# Patient Record
Sex: FEMALE | Race: BLACK OR AFRICAN AMERICAN | Employment: OTHER | URBAN - METROPOLITAN AREA
[De-identification: names, ages, dates, MRNs, and addresses within clinical notes are randomized per-mention and may not be internally consistent; named-entity substitution may affect disease eponyms.]

---

## 2017-01-23 PROBLEM — G89.29 CHRONIC PAIN OF BOTH KNEES: Status: ACTIVE | Noted: 2017-01-23

## 2017-01-23 PROBLEM — M25.562 CHRONIC PAIN OF BOTH KNEES: Status: ACTIVE | Noted: 2017-01-23

## 2017-01-23 PROBLEM — Z76.89 ESTABLISHING CARE WITH NEW DOCTOR, ENCOUNTER FOR: Status: ACTIVE | Noted: 2017-01-23

## 2017-01-23 PROBLEM — Z79.899 ENCOUNTER FOR LONG-TERM (CURRENT) USE OF MEDICATIONS: Status: ACTIVE | Noted: 2017-01-23

## 2017-01-23 PROBLEM — I10 ESSENTIAL HYPERTENSION: Status: ACTIVE | Noted: 2017-01-23

## 2017-01-23 PROBLEM — K21.9 GASTROESOPHAGEAL REFLUX DISEASE WITHOUT ESOPHAGITIS: Status: ACTIVE | Noted: 2017-01-23

## 2017-01-23 PROBLEM — E78.2 MIXED HYPERLIPIDEMIA: Status: ACTIVE | Noted: 2017-01-23

## 2017-01-23 PROBLEM — M25.561 CHRONIC PAIN OF BOTH KNEES: Status: ACTIVE | Noted: 2017-01-23

## 2017-05-01 PROBLEM — I10 ESSENTIAL HYPERTENSION: Chronic | Status: ACTIVE | Noted: 2017-01-23

## 2017-05-01 PROBLEM — Z79.899 ENCOUNTER FOR LONG-TERM (CURRENT) USE OF MEDICATIONS: Chronic | Status: ACTIVE | Noted: 2017-01-23

## 2017-05-01 PROBLEM — E55.9 VITAMIN D DEFICIENCY: Chronic | Status: ACTIVE | Noted: 2017-05-01

## 2017-05-01 PROBLEM — E78.2 MIXED HYPERLIPIDEMIA: Chronic | Status: ACTIVE | Noted: 2017-01-23

## 2017-05-01 PROBLEM — E55.9 VITAMIN D DEFICIENCY: Status: ACTIVE | Noted: 2017-05-01

## 2017-08-22 PROBLEM — M72.2 PLANTAR FASCIITIS OF LEFT FOOT: Chronic | Status: ACTIVE | Noted: 2017-08-22

## 2017-08-22 PROBLEM — M72.2 PLANTAR FASCIITIS OF LEFT FOOT: Status: ACTIVE | Noted: 2017-08-22

## 2017-10-04 PROCEDURE — 99285 EMERGENCY DEPT VISIT HI MDM: CPT | Performed by: EMERGENCY MEDICINE

## 2017-10-05 ENCOUNTER — HOSPITAL ENCOUNTER (EMERGENCY)
Age: 57
Discharge: HOME | End: 2017-10-05
Attending: EMERGENCY MEDICINE
Payer: MEDICARE

## 2017-10-05 VITALS
WEIGHT: 201 LBS | HEART RATE: 86 BPM | DIASTOLIC BLOOD PRESSURE: 86 MMHG | OXYGEN SATURATION: 97 % | TEMPERATURE: 98 F | HEIGHT: 65 IN | RESPIRATION RATE: 16 BRPM | BODY MASS INDEX: 33.49 KG/M2 | SYSTOLIC BLOOD PRESSURE: 144 MMHG

## 2017-10-05 DIAGNOSIS — R00.2 PALPITATIONS: Primary | ICD-10-CM

## 2017-10-05 LAB
ALBUMIN SERPL-MCNC: 3.4 G/DL (ref 3.5–5)
ALBUMIN/GLOB SERPL: 0.7 {RATIO} (ref 1.2–3.5)
ALP SERPL-CCNC: 96 U/L (ref 50–136)
ALT SERPL-CCNC: 14 U/L (ref 12–65)
ANION GAP SERPL CALC-SCNC: 10 MMOL/L (ref 7–16)
AST SERPL-CCNC: 12 U/L (ref 15–37)
ATRIAL RATE: 178 BPM
BASOPHILS # BLD: 0 K/UL (ref 0–0.2)
BASOPHILS NFR BLD: 0 % (ref 0–2)
BILIRUB SERPL-MCNC: 0.4 MG/DL (ref 0.2–1.1)
BUN SERPL-MCNC: 14 MG/DL (ref 6–23)
CALCIUM SERPL-MCNC: 8.6 MG/DL (ref 8.3–10.4)
CALCULATED R AXIS, ECG10: 21 DEGREES
CALCULATED T AXIS, ECG11: 90 DEGREES
CHLORIDE SERPL-SCNC: 100 MMOL/L (ref 98–107)
CO2 SERPL-SCNC: 28 MMOL/L (ref 21–32)
CREAT SERPL-MCNC: 0.71 MG/DL (ref 0.6–1)
DIAGNOSIS, 93000: NORMAL
DIFFERENTIAL METHOD BLD: ABNORMAL
EOSINOPHIL # BLD: 0.2 K/UL (ref 0–0.8)
EOSINOPHIL NFR BLD: 2 % (ref 0.5–7.8)
ERYTHROCYTE [DISTWIDTH] IN BLOOD BY AUTOMATED COUNT: 13.8 % (ref 11.9–14.6)
GLOBULIN SER CALC-MCNC: 5 G/DL (ref 2.3–3.5)
GLUCOSE SERPL-MCNC: 124 MG/DL (ref 65–100)
HCT VFR BLD AUTO: 35.9 % (ref 35.8–46.3)
HGB BLD-MCNC: 11.8 G/DL (ref 11.7–15.4)
IMM GRANULOCYTES # BLD: 0 K/UL (ref 0–0.5)
IMM GRANULOCYTES NFR BLD: 0.3 % (ref 0–5)
LYMPHOCYTES # BLD: 3.1 K/UL (ref 0.5–4.6)
LYMPHOCYTES NFR BLD: 30 % (ref 13–44)
MAGNESIUM SERPL-MCNC: 1.6 MG/DL (ref 1.8–2.4)
MCH RBC QN AUTO: 24.4 PG (ref 26.1–32.9)
MCHC RBC AUTO-ENTMCNC: 32.9 G/DL (ref 31.4–35)
MCV RBC AUTO: 74.3 FL (ref 79.6–97.8)
MONOCYTES # BLD: 0.9 K/UL (ref 0.1–1.3)
MONOCYTES NFR BLD: 9 % (ref 4–12)
NEUTS SEG # BLD: 6.2 K/UL (ref 1.7–8.2)
NEUTS SEG NFR BLD: 59 % (ref 43–78)
PLATELET # BLD AUTO: 396 K/UL (ref 150–450)
PMV BLD AUTO: 11.1 FL (ref 10.8–14.1)
POTASSIUM SERPL-SCNC: 3.3 MMOL/L (ref 3.5–5.1)
PROT SERPL-MCNC: 8.4 G/DL (ref 6.3–8.2)
Q-T INTERVAL, ECG07: 276 MS
QRS DURATION, ECG06: 82 MS
QTC CALCULATION (BEZET), ECG08: 473 MS
RBC # BLD AUTO: 4.83 M/UL (ref 4.05–5.25)
SODIUM SERPL-SCNC: 138 MMOL/L (ref 136–145)
TROPONIN I SERPL-MCNC: <0.02 NG/ML (ref 0.02–0.05)
VENTRICULAR RATE, ECG03: 177 BPM
WBC # BLD AUTO: 10.6 K/UL (ref 4.3–11.1)

## 2017-10-05 PROCEDURE — 93005 ELECTROCARDIOGRAM TRACING: CPT | Performed by: EMERGENCY MEDICINE

## 2017-10-05 PROCEDURE — 74011250637 HC RX REV CODE- 250/637: Performed by: EMERGENCY MEDICINE

## 2017-10-05 PROCEDURE — 84484 ASSAY OF TROPONIN QUANT: CPT | Performed by: EMERGENCY MEDICINE

## 2017-10-05 PROCEDURE — 85025 COMPLETE CBC W/AUTO DIFF WBC: CPT | Performed by: EMERGENCY MEDICINE

## 2017-10-05 PROCEDURE — 83735 ASSAY OF MAGNESIUM: CPT | Performed by: EMERGENCY MEDICINE

## 2017-10-05 PROCEDURE — 80053 COMPREHEN METABOLIC PANEL: CPT | Performed by: EMERGENCY MEDICINE

## 2017-10-05 RX ORDER — LANOLIN ALCOHOL/MO/W.PET/CERES
400 CREAM (GRAM) TOPICAL ONCE
Status: COMPLETED | OUTPATIENT
Start: 2017-10-05 | End: 2017-10-05

## 2017-10-05 RX ORDER — LANOLIN ALCOHOL/MO/W.PET/CERES
CREAM (GRAM) TOPICAL
Status: DISCONTINUED
Start: 2017-10-05 | End: 2017-10-05 | Stop reason: HOSPADM

## 2017-10-05 RX ORDER — METOPROLOL TARTRATE 25 MG/1
12.5 TABLET, FILM COATED ORAL ONCE
Status: COMPLETED | OUTPATIENT
Start: 2017-10-05 | End: 2017-10-05

## 2017-10-05 RX ORDER — ADENOSINE 3 MG/ML
INJECTION, SOLUTION INTRAVENOUS
Status: DISCONTINUED
Start: 2017-10-05 | End: 2017-10-05 | Stop reason: ALTCHOICE

## 2017-10-05 RX ADMIN — METOPROLOL TARTRATE 12.5 MG: 25 TABLET ORAL at 01:15

## 2017-10-05 RX ADMIN — Medication 400 MG: at 01:16

## 2017-10-05 NOTE — ED NOTES
I have reviewed discharge instructions with the patient. The patient verbalized understanding. Patient left ED via Discharge Method: ambulatory to Home with family. Opportunity for questions and clarification provided. Patient given 0 scripts.

## 2017-10-05 NOTE — DISCHARGE INSTRUCTIONS
Return with any fevers, vomiting, difficulty breathing, worsening symptoms, or additional concerns. Follow-up with your cardiologist and primary care doctor as planned.

## 2017-10-05 NOTE — ED PROVIDER NOTES
HPI Comments: 68-year-old lady with a history of palpitations that she says started couple hours prior to arrival.  Patient said that it felt like her heart was racing and when she tried to get up she felt very dizzy. She said she did not have any chest pain with this and has had no difficulty breathing or shortness of breath. Patient says she normally takes 12.5 mg of metoprolol twice a day but since she took her morning dose later than normal she did not take her nighttime dose. Patient says she's had no fevers or chills and no other symptoms. Elements of this note were created using speech recognition software. As such, errors of speech recognition may be present. Patient is a 59 y.o. female presenting with palpitations. The history is provided by the patient. Palpitations    Associated symptoms include dizziness. Pertinent negatives include no diaphoresis, no fever, no chest pain, no abdominal pain, no nausea, no vomiting, no headaches, no weakness, no cough and no shortness of breath.         Past Medical History:   Diagnosis Date    Allergic rhinitis     Anemia     Arthralgia     Arthritis     Bunion     Chronic pain of both knees 1/23/2017    Cough     Depression     Diabetes (HCC)     Disorder of bone and cartilage     Dysphagia     Esophageal reflux     Essential hypertension 1/23/2017    Gastroesophageal reflux disease without esophagitis 1/23/2017    Hx of headache     Hx of lymphadenopathy     Hyperlipemia     Hyperlipidemia     Hypertension     Irritable bowel syndrome     Medial epicondylitis, right elbow     Migraine     Mixed hyperlipidemia 1/23/2017    Nasal congestion     Palpitations     Sinusitis     URI (upper respiratory infection)        Past Surgical History:   Procedure Laterality Date    HX CHOLECYSTECTOMY      HX COLONOSCOPY  10/2016    HX HYSTERECTOMY      HX ORTHOPAEDIC      neck surgery    HX OTHER SURGICAL      lung biopsy         Family History:   Problem Relation Age of Onset    Diabetes Mother      had legs amputated at age 79    Hypertension Mother     Hypertension Father     Heart Attack Father     Diabetes Sister     Diabetes Brother     Cataract Brother        Social History     Social History    Marital status: SINGLE     Spouse name: N/A    Number of children: N/A    Years of education: N/A     Occupational History    Not on file. Social History Main Topics    Smoking status: Never Smoker    Smokeless tobacco: Never Used    Alcohol use No    Drug use: Not on file    Sexual activity: Not on file     Other Topics Concern    Not on file     Social History Narrative         ALLERGIES: Januvia [sitagliptin]    Review of Systems   Constitutional: Negative for chills, diaphoresis and fever. HENT: Negative for congestion, rhinorrhea and sore throat. Eyes: Negative for redness and visual disturbance. Respiratory: Negative for cough, chest tightness, shortness of breath and wheezing. Cardiovascular: Positive for palpitations. Negative for chest pain. Gastrointestinal: Negative for abdominal pain, blood in stool, diarrhea, nausea and vomiting. Endocrine: Negative for polydipsia and polyuria. Genitourinary: Negative for dysuria and hematuria. Musculoskeletal: Negative for arthralgias, myalgias and neck stiffness. Skin: Negative for rash. Allergic/Immunologic: Negative for environmental allergies and food allergies. Neurological: Positive for dizziness. Negative for weakness and headaches. Hematological: Negative for adenopathy. Does not bruise/bleed easily. Psychiatric/Behavioral: Negative for confusion and sleep disturbance. The patient is not nervous/anxious.         Vitals:    10/04/17 2358   BP: (!) 120/99   Pulse: (!) 174   Resp: 17   Temp: 98 °F (36.7 °C)   SpO2: 97%   Weight: 91.2 kg (201 lb)   Height: 5' 5\" (1.651 m)            Physical Exam   Constitutional: She is oriented to person, place, and time. She appears well-developed and well-nourished. HENT:   Head: Normocephalic and atraumatic. Eyes: Conjunctivae and EOM are normal. Pupils are equal, round, and reactive to light. Neck: Normal range of motion. Cardiovascular: Regular rhythm. ssignificant tachycardia   Pulmonary/Chest: Effort normal and breath sounds normal. No respiratory distress. She has no wheezes. She has no rales. She exhibits no tenderness. Abdominal: Soft. Bowel sounds are normal. There is no rebound and no guarding. Musculoskeletal: Normal range of motion. She exhibits no edema or tenderness. Lymphadenopathy:     She has no cervical adenopathy. Neurological: She is alert and oriented to person, place, and time. Skin: Skin is warm and dry. Psychiatric: She has a normal mood and affect. Nursing note and vitals reviewed. MDM  Number of Diagnoses or Management Options  Diagnosis management comments: ppatient's heart rate was initially in the 180 range. However, before we were able to give the adenosine she converted into a normal sinus rhythm. I will go ahead and give her her nighttime dose of 12.5 mg of metoprolol.     ED Course       Procedures

## 2017-10-30 PROBLEM — I47.1 SVT (SUPRAVENTRICULAR TACHYCARDIA) (HCC): Status: ACTIVE | Noted: 2017-10-30

## 2017-10-30 PROBLEM — Z82.49 FAM HX-ISCHEM HEART DISEASE: Status: ACTIVE | Noted: 2017-10-30

## 2017-12-12 ENCOUNTER — HOSPITAL ENCOUNTER (OUTPATIENT)
Dept: MAMMOGRAPHY | Age: 57
Discharge: HOME | End: 2017-12-12
Attending: FAMILY MEDICINE
Payer: MEDICARE

## 2017-12-12 DIAGNOSIS — Z78.0 MENOPAUSE: ICD-10-CM

## 2017-12-12 DIAGNOSIS — Z12.31 ENCOUNTER FOR SCREENING MAMMOGRAM FOR MALIGNANT NEOPLASM OF BREAST: ICD-10-CM

## 2017-12-12 PROBLEM — M85.88 OSTEOPENIA OF SPINE: Chronic | Status: ACTIVE | Noted: 2017-12-12

## 2017-12-12 PROBLEM — M85.88 OSTEOPENIA OF SPINE: Status: ACTIVE | Noted: 2017-12-12

## 2017-12-12 PROCEDURE — 77080 DXA BONE DENSITY AXIAL: CPT

## 2017-12-12 PROCEDURE — 77067 SCR MAMMO BI INCL CAD: CPT

## 2018-01-30 PROBLEM — K59.04 CHRONIC IDIOPATHIC CONSTIPATION: Status: ACTIVE | Noted: 2018-01-30

## 2018-01-30 PROBLEM — K59.04 CHRONIC IDIOPATHIC CONSTIPATION: Chronic | Status: ACTIVE | Noted: 2018-01-30

## 2018-05-14 PROBLEM — E11.9 DIABETES MELLITUS TYPE 2, DIET-CONTROLLED (HCC): Status: ACTIVE | Noted: 2018-05-14

## 2018-05-14 PROBLEM — S92.524A CLOSED NONDISPLACED FRACTURE OF MIDDLE PHALANX OF LESSER TOE OF RIGHT FOOT: Chronic | Status: ACTIVE | Noted: 2018-05-14

## 2018-05-14 PROBLEM — S92.524A CLOSED NONDISPLACED FRACTURE OF MIDDLE PHALANX OF LESSER TOE OF RIGHT FOOT: Status: ACTIVE | Noted: 2018-05-14

## 2018-05-14 PROBLEM — E11.9 DIABETES MELLITUS TYPE 2, DIET-CONTROLLED (HCC): Chronic | Status: ACTIVE | Noted: 2018-05-14

## 2018-12-06 PROBLEM — E11.9 DIABETES MELLITUS WITHOUT COMPLICATION (HCC): Status: ACTIVE | Noted: 2018-12-06

## 2018-12-06 PROBLEM — E11.40 TYPE 2 DIABETES MELLITUS WITH DIABETIC NEUROPATHY (HCC): Status: ACTIVE | Noted: 2018-12-06

## 2018-12-06 PROBLEM — N95.1 MENOPAUSE SYNDROME: Status: ACTIVE | Noted: 2018-12-06

## 2018-12-06 PROBLEM — F32.A MILD DEPRESSION: Status: ACTIVE | Noted: 2018-12-06

## 2018-12-06 PROBLEM — F51.01 PRIMARY INSOMNIA: Status: ACTIVE | Noted: 2018-12-06

## 2018-12-08 ENCOUNTER — APPOINTMENT (OUTPATIENT)
Dept: GENERAL RADIOLOGY | Age: 58
End: 2018-12-08
Attending: EMERGENCY MEDICINE
Payer: MEDICARE

## 2018-12-08 ENCOUNTER — HOSPITAL ENCOUNTER (EMERGENCY)
Age: 58
Discharge: HOME | End: 2018-12-08
Attending: EMERGENCY MEDICINE
Payer: MEDICARE

## 2018-12-08 VITALS
OXYGEN SATURATION: 100 % | TEMPERATURE: 97.8 F | RESPIRATION RATE: 31 BRPM | HEIGHT: 65 IN | BODY MASS INDEX: 30.99 KG/M2 | SYSTOLIC BLOOD PRESSURE: 144 MMHG | DIASTOLIC BLOOD PRESSURE: 87 MMHG | WEIGHT: 186 LBS | HEART RATE: 74 BPM

## 2018-12-08 DIAGNOSIS — R07.89 ATYPICAL CHEST PAIN: Primary | ICD-10-CM

## 2018-12-08 DIAGNOSIS — R10.13 DYSPEPSIA: ICD-10-CM

## 2018-12-08 LAB
ALBUMIN SERPL-MCNC: 3.5 G/DL (ref 3.2–4.6)
ALBUMIN/GLOB SERPL: 0.7 {RATIO} (ref 1.2–3.5)
ALP SERPL-CCNC: 106 U/L (ref 50–136)
ALT SERPL-CCNC: 16 U/L (ref 12–65)
ANION GAP SERPL CALC-SCNC: 8 MMOL/L (ref 7–16)
AST SERPL-CCNC: 8 U/L (ref 15–37)
BASOPHILS # BLD: 0.1 K/UL (ref 0–0.2)
BASOPHILS NFR BLD: 1 % (ref 0–2)
BILIRUB SERPL-MCNC: 0.3 MG/DL (ref 0.2–1.1)
BUN SERPL-MCNC: 20 MG/DL (ref 8–23)
CALCIUM SERPL-MCNC: 8.9 MG/DL (ref 8.3–10.4)
CHLORIDE SERPL-SCNC: 100 MMOL/L (ref 98–107)
CO2 SERPL-SCNC: 28 MMOL/L (ref 21–32)
CREAT SERPL-MCNC: 0.73 MG/DL (ref 0.6–1)
DIFFERENTIAL METHOD BLD: ABNORMAL
EOSINOPHIL # BLD: 0.2 K/UL (ref 0–0.8)
EOSINOPHIL NFR BLD: 3 % (ref 0.5–7.8)
ERYTHROCYTE [DISTWIDTH] IN BLOOD BY AUTOMATED COUNT: 13.3 % (ref 11.9–14.6)
GLOBULIN SER CALC-MCNC: 4.9 G/DL (ref 2.3–3.5)
GLUCOSE SERPL-MCNC: 179 MG/DL (ref 65–100)
HCT VFR BLD AUTO: 37.8 % (ref 35.8–46.3)
HGB BLD-MCNC: 11.8 G/DL (ref 11.7–15.4)
IMM GRANULOCYTES # BLD: 0 K/UL (ref 0–0.5)
IMM GRANULOCYTES NFR BLD AUTO: 1 % (ref 0–5)
LYMPHOCYTES # BLD: 3 K/UL (ref 0.5–4.6)
LYMPHOCYTES NFR BLD: 37 % (ref 13–44)
MCH RBC QN AUTO: 24.5 PG (ref 26.1–32.9)
MCHC RBC AUTO-ENTMCNC: 31.2 G/DL (ref 31.4–35)
MCV RBC AUTO: 78.4 FL (ref 79.6–97.8)
MONOCYTES # BLD: 0.6 K/UL (ref 0.1–1.3)
MONOCYTES NFR BLD: 8 % (ref 4–12)
NEUTS SEG # BLD: 4.1 K/UL (ref 1.7–8.2)
NEUTS SEG NFR BLD: 51 % (ref 43–78)
NRBC # BLD: 0 K/UL (ref 0–0.2)
PLATELET # BLD AUTO: 334 K/UL (ref 150–450)
PMV BLD AUTO: 11.8 FL (ref 9.4–12.3)
POTASSIUM SERPL-SCNC: 3.5 MMOL/L (ref 3.5–5.1)
PROT SERPL-MCNC: 8.4 G/DL (ref 6.3–8.2)
RBC # BLD AUTO: 4.82 M/UL (ref 4.05–5.2)
SODIUM SERPL-SCNC: 136 MMOL/L (ref 136–145)
TROPONIN I BLD-MCNC: 0 NG/ML (ref 0.02–0.05)
TROPONIN I SERPL-MCNC: <0.02 NG/ML (ref 0.02–0.05)
WBC # BLD AUTO: 8 K/UL (ref 4.3–11.1)

## 2018-12-08 PROCEDURE — 84484 ASSAY OF TROPONIN QUANT: CPT

## 2018-12-08 PROCEDURE — 80053 COMPREHEN METABOLIC PANEL: CPT

## 2018-12-08 PROCEDURE — 71046 X-RAY EXAM CHEST 2 VIEWS: CPT

## 2018-12-08 PROCEDURE — 93005 ELECTROCARDIOGRAM TRACING: CPT | Performed by: EMERGENCY MEDICINE

## 2018-12-08 PROCEDURE — 99285 EMERGENCY DEPT VISIT HI MDM: CPT | Performed by: EMERGENCY MEDICINE

## 2018-12-08 PROCEDURE — 85025 COMPLETE CBC W/AUTO DIFF WBC: CPT

## 2018-12-08 PROCEDURE — 74011250637 HC RX REV CODE- 250/637: Performed by: EMERGENCY MEDICINE

## 2018-12-08 RX ORDER — SODIUM CHLORIDE 0.9 % (FLUSH) 0.9 %
5-10 SYRINGE (ML) INJECTION EVERY 8 HOURS
Status: DISCONTINUED | OUTPATIENT
Start: 2018-12-08 | End: 2018-12-09 | Stop reason: HOSPADM

## 2018-12-08 RX ORDER — SODIUM CHLORIDE 0.9 % (FLUSH) 0.9 %
5-10 SYRINGE (ML) INJECTION AS NEEDED
Status: DISCONTINUED | OUTPATIENT
Start: 2018-12-08 | End: 2018-12-09 | Stop reason: HOSPADM

## 2018-12-08 RX ORDER — MAG HYDROX/ALUMINUM HYD/SIMETH 200-200-20
30 SUSPENSION, ORAL (FINAL DOSE FORM) ORAL
Status: COMPLETED | OUTPATIENT
Start: 2018-12-08 | End: 2018-12-08

## 2018-12-08 RX ORDER — SUCRALFATE 1 G/1
1 TABLET ORAL 4 TIMES DAILY
Qty: 28 TAB | Refills: 0 | Status: SHIPPED | OUTPATIENT
Start: 2018-12-08 | End: 2019-04-29

## 2018-12-08 RX ORDER — FAMOTIDINE 20 MG/1
20 TABLET, FILM COATED ORAL
Status: COMPLETED | OUTPATIENT
Start: 2018-12-08 | End: 2018-12-08

## 2018-12-08 RX ORDER — HYDROGEN PEROXIDE 3 %
40 SOLUTION, NON-ORAL MISCELLANEOUS DAILY
Qty: 40 CAP | Refills: 0 | Status: SHIPPED | OUTPATIENT
Start: 2018-12-08 | End: 2018-12-11 | Stop reason: ALTCHOICE

## 2018-12-08 RX ORDER — ONDANSETRON 8 MG/1
8 TABLET, ORALLY DISINTEGRATING ORAL
Qty: 12 TAB | Refills: 0 | Status: SHIPPED | OUTPATIENT
Start: 2018-12-08 | End: 2019-04-29

## 2018-12-08 RX ADMIN — ALUMINUM HYDROXIDE, MAGNESIUM HYDROXIDE, AND SIMETHICONE 30 ML: 200; 200; 20 SUSPENSION ORAL at 20:13

## 2018-12-08 RX ADMIN — FAMOTIDINE 20 MG: 20 TABLET ORAL at 20:13

## 2018-12-09 LAB
ATRIAL RATE: 78 BPM
CALCULATED P AXIS, ECG09: 64 DEGREES
CALCULATED R AXIS, ECG10: -10 DEGREES
CALCULATED T AXIS, ECG11: 62 DEGREES
DIAGNOSIS, 93000: NORMAL
P-R INTERVAL, ECG05: 174 MS
Q-T INTERVAL, ECG07: 376 MS
QRS DURATION, ECG06: 76 MS
QTC CALCULATION (BEZET), ECG08: 428 MS
VENTRICULAR RATE, ECG03: 78 BPM

## 2018-12-09 NOTE — ED PROVIDER NOTES
The history is provided by the patient. Chest Pain (Angina) The current episode started more than 1 week ago. The problem has been gradually worsening. The problem occurs daily. The pain is associated with normal activity. The pain is present in the substernal region. The pain is moderate. The quality of the pain is described as burning. Radiates to: neck/throat. Exacerbated by: eating, tristen pork skins. Pertinent negatives include no abdominal pain, no back pain, no cough, no diaphoresis, no fever, no nausea, no numbness, no shortness of breath, no vomiting and no weakness. Treatments tried: on omeprazole and taking Tums as needed. The treatment provided no relief. Risk factors include hypertension and family history. Her past medical history does not include DVT or PE. Pertinent negatives include no cardiac catheterization, no cardiac stents and no CABG. Past Medical History:  
Diagnosis Date  Allergic rhinitis  Anemia  Arthralgia  Arthritis  Bunion  Chronic pain of both knees 1/23/2017  Closed nondisplaced fracture of middle phalanx of lesser toe of right foot 5/14/2018  Closed nondisplaced fracture of middle phalanx of lesser toe of right foot 5/14/2018 5/5/18  Cough  Depression  Diabetes ([address]  Diabetes mellitus type 2, diet-controlled ([address] 5/14/2018  Disorder of bone and cartilage  Dysphagia  Esophageal reflux  Essential hypertension 1/23/2017  Gastroesophageal reflux disease without esophagitis 1/23/2017  Hx of headache  Hx of lymphadenopathy  Hyperlipemia  Hyperlipidemia  Hypertension  Irritable bowel syndrome  Medial epicondylitis, right elbow  Migraine  Mixed hyperlipidemia 1/23/2017  Nasal congestion  Osteopenia of spine 12/12/2017  Palpitations  Sinusitis  URI (upper respiratory infection) Past Surgical History:  
Procedure Laterality Date  HX BREAST REDUCTION Bilateral   
 HX CHOLECYSTECTOMY  HX COLONOSCOPY  10/2016  HX HYSTERECTOMY  HX ORTHOPAEDIC    
 neck surgery  HX OTHER SURGICAL    
 lung biopsy Family History:  
Problem Relation Age of Onset  Diabetes Mother   
     had legs amputated at age 79  Hypertension Mother  Hypertension Father  Heart Attack Father  Diabetes Sister  Breast Cancer Sister 47  Diabetes Brother  Cataract Brother Social History Socioeconomic History  Marital status: SINGLE Spouse name: Not on file  Number of children: Not on file  Years of education: Not on file  Highest education level: Not on file Social Needs  Financial resource strain: Not on file  Food insecurity - worry: Not on file  Food insecurity - inability: Not on file  Transportation needs - medical: Not on file  Transportation needs - non-medical: Not on file Occupational History  Not on file Tobacco Use  Smoking status: Never Smoker  Smokeless tobacco: Never Used Substance and Sexual Activity  Alcohol use: No  
 Drug use: Not on file  Sexual activity: Not on file Other Topics Concern  Not on file Social History Narrative  Not on file ALLERGIES: Januvia [sitagliptin] Review of Systems Constitutional: Negative for diaphoresis and fever. HENT: Negative for congestion and rhinorrhea. Respiratory: Negative for cough and shortness of breath. Cardiovascular: Positive for chest pain. Gastrointestinal: Negative for abdominal pain, nausea and vomiting. Endocrine: Negative for polyuria. Genitourinary: Negative for dysuria. Musculoskeletal: Negative for back pain. Neurological: Negative for weakness and numbness. Vitals:  
 12/08/18 1922 12/08/18 2008 BP: 150/89 (!) 170/91 Pulse: 82 73 Resp: 16 Temp: 97.8 °F (36.6 °C) SpO2: 99% 98% Weight: 84.4 kg (186 lb) Height: 5' 5\" (1.651 m) Physical Exam  
Constitutional: She appears well-developed and well-nourished. No distress. HENT:  
Head: Normocephalic. Mouth/Throat: Oropharynx is clear and moist.  
Eyes: Pupils are equal, round, and reactive to light. Cardiovascular: Normal rate, regular rhythm and normal heart sounds. Pulmonary/Chest: Effort normal and breath sounds normal. She exhibits no tenderness. Abdominal: Soft. She exhibits no distension and no mass. There is no tenderness. There is no rebound and no guarding. Musculoskeletal: Normal range of motion. Lymphadenopathy:  
  She has no cervical adenopathy. Neurological: She is alert. Skin: Skin is warm and dry. Nursing note and vitals reviewed. MDM Number of Diagnoses or Management Options Diagnosis management comments: Atypical chest pain that seems more consistent with GI source and esophagitis or gastritis. Patient denies any dysphasia. Rule out MI. No hypoxia tachycardia or pleuritic component to suggest pulmonary embolism. No leg swelling and no hemoptysis. Rule out MI and begin Carafate, adjust omeprazole, refer to [facility] for possible outpatient EGD or via PCP. Heart score is 2MAce risk 0.9-1.7% Amount and/or Complexity of Data Reviewed Clinical lab tests: ordered and reviewed Tests in the radiology section of CPT®: ordered and reviewed Procedures

## 2018-12-09 NOTE — ED NOTES
I have reviewed discharge instructions with the patient. The patient verbalized understanding. Patient left ED via Discharge Method: ambulatory to Home with self. Opportunity for questions and clarification provided. Patient given 3 scripts. To continue your aftercare when you leave the hospital, you may receive an automated call from our care team to check in on how you are doing. This is a free service and part of our promise to provide the best care and service to meet your aftercare needs.  If you have questions, or wish to unsubscribe from this service please call [phone]. Thank you for Choosing our [facility] Emergency Department.

## 2018-12-09 NOTE — ED TRIAGE NOTES
Right neck, shoulder and chest pain x 1 month. States pain reoccurs with pain being its worst today.   Correcting posture and hitting chest has helped pain in the past.

## 2018-12-09 NOTE — DISCHARGE INSTRUCTIONS
Indigestion (Dyspepsia or Heartburn): Care Instructions  Your Care Instructions  Sometimes it can be hard to pinpoint the cause of indigestion. (It is also called dyspepsia or heartburn.) Most cases of an upset stomach with bloating, burning, burping, and nausea are minor and go away within several hours. Home treatment and over-the-counter medicine often are able to control symptoms. But if you take medicine to relieve your indigestion without making diet and lifestyle changes, your symptoms are likely to return again and again. If you get indigestion often, it may be a sign of a more serious medical problem. Be sure to follow up with your doctor, who may want to do tests to be sure of the cause of your indigestion. Follow-up care is a key part of your treatment and safety. Be sure to make and go to all appointments, and call your doctor if you are having problems. It's also a good idea to know your test results and keep a list of the medicines you take. How can you care for yourself at home? · Your doctor may recommend over-the-counter medicine. For mild or occasional indigestion, antacids such as Gaviscon, Mylanta, Maalox, or Tums, may help. Be safe with medicines. Be careful when you take over-the-counter antacid medicines. Many of these medicines have aspirin in them. Read the label to make sure that you are not taking more than the recommended dose. Too much aspirin can be harmful. · Your doctor also may recommend over-the-counter acid reducers, such as Pepcid AC, Tagamet HB, Zantac 75, or Prilosec. Read and follow all instructions on the label. If you use these medicines often, talk with your doctor. · Change your eating habits. ? It's best to eat several small meals instead of two or three large meals. ? After you eat, wait 2 to 3 hours before you lie down. ? Chocolate, mint, and alcohol can make GERD worse. ?  Spicy foods, foods that have a lot of acid (like tomatoes and oranges), and coffee can make GERD symptoms worse in some people. If your symptoms are worse after you eat a certain food, you may want to stop eating that food to see if your symptoms get better. · Do not smoke or chew tobacco. Smoking can make GERD worse. If you need help quitting, talk to your doctor about stop-smoking programs and medicines. These can increase your chances of quitting for good. · If you have GERD symptoms at night, raise the head of your bed 6 to 8 inches. You can do this by putting the frame on blocks or placing a foam wedge under the head of your mattress. (Adding extra pillows does not work.)  · Do not wear tight clothing around your middle. · Lose weight if you need to. Losing just 5 to 10 pounds can help. · Do not take anti-inflammatory medicines, such as aspirin, ibuprofen (Advil, Motrin), or naproxen (Aleve). These can irritate the stomach. If you need a pain medicine, try acetaminophen (Tylenol), which does not cause stomach upset. When should you call for help? Call your doctor now or seek immediate medical care if:    · You have new or worse belly pain.     · You are vomiting.    Watch closely for changes in your health, and be sure to contact your doctor if:    · You have new or worse symptoms of indigestion.     · You have trouble or pain swallowing.     · You are losing weight.     · You do not get better as expected. Where can you learn more? Go to http://ayan-shanda.info/. Enter Y775 in the search box to learn more about \"Indigestion (Dyspepsia or Heartburn): Care Instructions. \"  Current as of: March 28, 2018  Content Version: 11.8  © 4215-8581 [employer]. Care instructions adapted under license by [employer] (which disclaims liability or warranty for this information).  If you have questions about a medical condition or this instruction, always ask your healthcare professional. [address] any warranty or liability for your use of this information.

## 2018-12-17 ENCOUNTER — HOSPITAL ENCOUNTER (OUTPATIENT)
Dept: MAMMOGRAPHY | Age: 58
Discharge: HOME | End: 2018-12-17
Attending: FAMILY MEDICINE
Payer: MEDICARE

## 2018-12-17 DIAGNOSIS — Z12.39 SCREENING FOR BREAST CANCER: ICD-10-CM

## 2018-12-17 PROCEDURE — 77067 SCR MAMMO BI INCL CAD: CPT

## 2018-12-26 ENCOUNTER — HOSPITAL ENCOUNTER (OUTPATIENT)
Dept: LAB | Age: 58
Discharge: HOME | End: 2018-12-26

## 2018-12-26 PROCEDURE — 88305 TISSUE EXAM BY PATHOLOGIST: CPT

## 2018-12-27 RX ORDER — LIDOCAINE HYDROCHLORIDE 20 MG/ML
15 SOLUTION OROPHARYNGEAL AS NEEDED
Status: CANCELLED | OUTPATIENT
Start: 2018-12-27

## 2019-01-14 RX ORDER — LIDOCAINE HYDROCHLORIDE 20 MG/ML
15 SOLUTION OROPHARYNGEAL AS NEEDED
Status: CANCELLED | OUTPATIENT
Start: 2019-01-14

## 2019-01-15 ENCOUNTER — HOSPITAL ENCOUNTER (OUTPATIENT)
Age: 59
Setting detail: OUTPATIENT SURGERY
Discharge: HOME | End: 2019-01-15
Attending: INTERNAL MEDICINE | Admitting: INTERNAL MEDICINE
Payer: MEDICARE

## 2019-01-15 ENCOUNTER — HOSPITAL ENCOUNTER (OUTPATIENT)
Dept: LAB | Age: 59
Discharge: HOME | End: 2019-01-15
Attending: INTERNAL MEDICINE | Admitting: INTERNAL MEDICINE
Payer: MEDICARE

## 2019-01-15 PROCEDURE — 83520 IMMUNOASSAY QUANT NOS NONAB: CPT

## 2019-01-15 PROCEDURE — 77030031717 HC CATH PRB COMFORTEC DISP SAND -B: Performed by: INTERNAL MEDICINE

## 2019-01-15 PROCEDURE — 83516 IMMUNOASSAY NONANTIBODY: CPT

## 2019-01-15 PROCEDURE — 36415 COLL VENOUS BLD VENIPUNCTURE: CPT

## 2019-01-15 PROCEDURE — 91010 ESOPHAGUS MOTILITY STUDY: CPT | Performed by: INTERNAL MEDICINE

## 2019-01-15 PROCEDURE — 91034 GASTROESOPHAGEAL REFLUX TEST: CPT | Performed by: INTERNAL MEDICINE

## 2019-01-15 PROCEDURE — 74011000250 HC RX REV CODE- 250: Performed by: INTERNAL MEDICINE

## 2019-01-15 RX ORDER — LIDOCAINE HYDROCHLORIDE 20 MG/ML
15 SOLUTION OROPHARYNGEAL AS NEEDED
Status: DISCONTINUED | OUTPATIENT
Start: 2019-01-15 | End: 2019-01-15 | Stop reason: HOSPADM

## 2019-01-15 RX ORDER — LIDOCAINE HYDROCHLORIDE 20 MG/ML
30 SOLUTION OROPHARYNGEAL AS NEEDED
Status: DISCONTINUED | OUTPATIENT
Start: 2019-01-15 | End: 2019-01-15 | Stop reason: HOSPADM

## 2019-01-15 RX ADMIN — LIDOCAINE HYDROCHLORIDE 15 ML: 20 SOLUTION ORAL; TOPICAL at 12:25

## 2019-01-15 NOTE — PROGRESS NOTES
Attempted to have Dr. Lenore Marquez sign orders and release for pt to have Manometry/pH study. Unable to reach Dr. Lenore Marquez to have this performed. Dr. Sana Gtz, in facility, gave verbal orders to have Manometry/ pH study performed. Study will be sent to Dr. Lenore Marquez after performed and read by Dr. Dominic Meier.

## 2019-01-15 NOTE — PROGRESS NOTES
Pt tolerated Manometry probe placement. Probe at 44 cm. Pt tolerated pH study probe placement. Probe placed at 34 cm. Stomach pH on insertion was 2.4. Pt given instructions for recording device for pH study and instructed to bring paper back tomorrow after 1pm to have probe removed.

## 2019-01-16 ENCOUNTER — HOSPITAL ENCOUNTER (OUTPATIENT)
Age: 59
Setting detail: OUTPATIENT SURGERY
Discharge: HOME | End: 2019-01-16
Attending: INTERNAL MEDICINE | Admitting: INTERNAL MEDICINE
Payer: MEDICARE

## 2019-01-16 LAB — TTG IGA SER-ACNC: <2 U/ML (ref 0–3)

## 2019-01-16 PROCEDURE — 99211 OFF/OP EST MAY X REQ PHY/QHP: CPT | Performed by: INTERNAL MEDICINE

## 2019-01-16 NOTE — PROGRESS NOTES
Pt arrived to ENDO to have 24 hour pH study probe removed. Pt had no complaints. Tolerated removal w/o issue. Pt brought paperwork with recording device. Pt d/c home and informed office will be calling with results.

## 2019-01-18 LAB — PROMETHEUS CELIAC PL,XPCEPT: NORMAL

## 2019-01-21 NOTE — PROCEDURES
[address] PROCEDURE NOTE Roseline Samuels 
MR#: [id number] : 1960 ACCOUNT #: [de-identified] DATE OF SERVICE: 01/15/2019 PROCEDURES PERFORMED:  High resolution impedance manometry. PREOPERATIVE DIAGNOSIS:  Left upper quadrant pain. POSTOPERATIVE DIAGNOSES: 
1. Hiatal hernia present. 2.  Borderline outlet obstruction with IRP averaging 20. 
3.  Nonspecific motility disturbance -- see description below. DESCRIPTION OF PROCEDURE:  After obtaining informed consent, the patient underwent nasal intubation. The lower esophageal sphincter was located between 39.2 and 43.1 cm. There did appear to be a type 3 EGJ phenotype suggesting the presence of a hiatal hernia. The resting pressure at the LES mid respiration was 41, which is within normal limits, but slightly high. The IRP was elevated on many of the swallows with an average of 20, which is borderline high, suggesting outlet obstruction. FINDINGS:  Patient had 10 wet swallows of which the average DCI was 1109 however, the vast majority of swallows, 70%, revealed abnormally low DCI. The pressures were quite low. There were 4 failed and 3 additional weak swallows. By impedance data only 20% revealed complete transit of the bolus with a definite exit. For viscus this was also 20%. The numbers were similar if slightly worse with viscous swallows. DISPOSITION:  Further followup per Dr. Eric Mims. MD Shanique Fermin / [place] 
D: 2019 14:20 T: 2019 15:32 
JOB #: [id number] CC: DEVAUGHN SEE MD 
CC: Marge Gallo MD

## 2019-01-21 NOTE — PROCEDURES
[address]  PROCEDURE NOTE    Yaneth Perez  MR#: [id number]  : 1960  ACCOUNT #: [de-identified]   DATE OF SERVICE: 01/15/2019    PROCEDURES PERFORMED:  High resolution impedance manometry. PREOPERATIVE DIAGNOSIS:  Left upper quadrant pain. POSTOPERATIVE DIAGNOSES:  1. Hiatal hernia present. 2.  Borderline outlet obstruction with IRP averaging 20.  3.  Nonspecific motility disturbance -- see description below. DESCRIPTION OF PROCEDURE:  After obtaining informed consent, the patient underwent nasal intubation. The lower esophageal sphincter was located between 39.2 and 43.1 cm. There did appear to be a type 3 EGJ phenotype suggesting the presence of a hiatal hernia. The resting pressure at the LES mid respiration was 41, which is within normal limits, but slightly high. The IRP was elevated on many of the swallows with an average of 20, which is borderline high, suggesting outlet obstruction. FINDINGS:  Patient had 10 wet swallows of which the average DCI was 1109 however, the vast majority of swallows, 70%, revealed abnormally low DCI. The pressures were quite low. There were 4 failed and 3 additional weak swallows. By impedance data only 20% revealed complete transit of the bolus with a definite exit. For viscus this was also 20%. The numbers were similar if slightly worse with viscous swallows. DISPOSITION:  Further followup per Dr. Joseph Hull.       MD Shanique Rutherford / [place]  D: 2019 14:20     T: 2019 15:32  JOB #: [id number]  CC: Philly Vaz MD  CC: Peace Mondragon MD

## 2019-01-21 NOTE — PROCEDURES
[address] [address]  PROCEDURE NOTE    Jose Francisco Santiago  MR#: [id number]  : 1960  ACCOUNT #: [de-identified]   DATE OF SERVICE: 01/15/2019    PROCEDURE:  24- pH impedance monitoring. PREOPERATIVE DIAGNOSIS:  Left upper quadrant pain. POSTOPERATIVE DIAGNOSES:  1.  No abnormal reflux by acid data -- acidification time 1.3% and DeMeester score 3.9.  2.  No abnormal reflux by impedance data with total 72 episodes. 3.  Positive symptom index for bloating, negative for heart flutter and choking. DESCRIPTION OF PROCEDURE:  After obtaining informed consent, the patient underwent nasal intubation with the pH impedance probe. They had been off [address] [place] [address] [employer] since  and off [employer] since . The procedure duration was uncertain. During this procedure, the patient had 16 upright and 0 recumbent acid reflux episodes. The exposure time was 2% for upright 1.2% for recumbent for a total of 1.3%, all within normal limits. Patient had symptoms of bloating x3 with a symptom index of 66.7%, heart flutter with a symptom index of 100% and choking with a symptom index of 9.1% by acid. By impedance for bloating, heart flutter and choking, the symptom index was 100%, 0% and 27.3% respectively. It appears that only the bloating was probably related by statistical analysis. DISPOSITION:  Further followup by Dr. Roseanna Duvall.       MD Shanique Godfrey / [place]  D: 2019 13:35     T: 2019 14:46  JOB #: [id number]  CC: Silvia Collins MD

## 2019-01-21 NOTE — PROCEDURES
[address] PROCEDURE NOTE Fernando Huffman 
MR#: [id number] : 1960 ACCOUNT #: [de-identified] DATE OF SERVICE: 01/15/2019 PROCEDURE:  24- pH impedance monitoring. PREOPERATIVE DIAGNOSIS:  Left upper quadrant pain. POSTOPERATIVE DIAGNOSES: 
1.  No abnormal reflux by acid data -- acidification time 1.3% and DeMeester score 3.9. 
2.  No abnormal reflux by impedance data with total 72 episodes. 3.  Positive symptom index for bloating, negative for heart flutter and choking. DESCRIPTION OF PROCEDURE:  After obtaining informed consent, the patient underwent nasal intubation with the pH impedance probe. They had been off [employer] since  and off [employer] since . The procedure duration was uncertain. During this procedure, the patient had 16 upright and 0 recumbent acid reflux episodes. The exposure time was 2% for upright 1.2% for recumbent for a total of 1.3%, all within normal limits. Patient had symptoms of bloating x3 with a symptom index of 66.7%, heart flutter with a symptom index of 100% and choking with a symptom index of 9.1% by acid. By impedance for bloating, heart flutter and choking, the symptom index was 100%, 0% and 27.3% respectively. It appears that only the bloating was probably related by statistical analysis. DISPOSITION:  Further followup by Dr. Levon Snellen. MD Shanique Potts / [place] 
D: 2019 13:35    
T: 2019 14:46 JOB #: [id number] CC: Louana Primrose MD

## 2019-04-29 PROBLEM — I47.1 SVT (SUPRAVENTRICULAR TACHYCARDIA) (HCC): Status: RESOLVED | Noted: 2017-10-30 | Resolved: 2019-04-29

## 2019-04-29 PROBLEM — E11.40 TYPE 2 DIABETES MELLITUS WITH DIABETIC NEUROPATHY (HCC): Status: RESOLVED | Noted: 2018-12-06 | Resolved: 2019-04-29

## 2019-09-03 PROBLEM — R63.5 WEIGHT GAIN: Status: ACTIVE | Noted: 2019-09-03

## 2019-09-03 PROBLEM — E66.9 OBESITY (BMI 30-39.9): Status: ACTIVE | Noted: 2019-09-03

## 2019-10-29 ENCOUNTER — HOSPITAL ENCOUNTER (OUTPATIENT)
Dept: GENERAL RADIOLOGY | Age: 59
Discharge: HOME | End: 2019-10-29
Payer: MEDICARE

## 2019-10-29 DIAGNOSIS — M79.671 RIGHT FOOT PAIN: ICD-10-CM

## 2019-10-29 PROCEDURE — 73630 X-RAY EXAM OF FOOT: CPT

## 2020-01-14 PROBLEM — K22.4 DYSKINESIA OF ESOPHAGUS: Status: ACTIVE | Noted: 2020-01-14

## 2020-01-14 PROBLEM — K44.9 HIATAL HERNIA: Status: ACTIVE | Noted: 2020-01-14

## 2020-01-14 PROBLEM — M79.675 GREAT TOE PAIN, LEFT: Status: ACTIVE | Noted: 2020-01-14

## 2020-01-14 PROBLEM — R13.10 DYSPHAGIA: Status: ACTIVE | Noted: 2020-01-14

## 2020-04-03 PROBLEM — K64.8 INTERNAL HEMORRHOIDS: Status: ACTIVE | Noted: 2020-04-03

## 2020-06-18 ENCOUNTER — HOSPITAL ENCOUNTER (OUTPATIENT)
Dept: MAMMOGRAPHY | Age: 60
Discharge: HOME | End: 2020-06-18
Attending: FAMILY MEDICINE
Payer: MEDICARE

## 2020-06-18 DIAGNOSIS — Z12.31 VISIT FOR SCREENING MAMMOGRAM: ICD-10-CM

## 2020-06-18 PROCEDURE — 77063 BREAST TOMOSYNTHESIS BI: CPT

## 2020-10-24 ENCOUNTER — APPOINTMENT (OUTPATIENT)
Dept: GENERAL RADIOLOGY | Age: 60
End: 2020-10-24
Payer: MEDICARE

## 2020-10-24 ENCOUNTER — HOSPITAL ENCOUNTER (EMERGENCY)
Age: 60
Discharge: HOME | End: 2020-10-24
Payer: MEDICARE

## 2020-10-24 VITALS
DIASTOLIC BLOOD PRESSURE: 76 MMHG | BODY MASS INDEX: 30.73 KG/M2 | SYSTOLIC BLOOD PRESSURE: 135 MMHG | WEIGHT: 180 LBS | HEART RATE: 73 BPM | RESPIRATION RATE: 18 BRPM | TEMPERATURE: 98 F | OXYGEN SATURATION: 99 % | HEIGHT: 64 IN

## 2020-10-24 DIAGNOSIS — M79.601 PAIN OF RIGHT UPPER EXTREMITY: Primary | ICD-10-CM

## 2020-10-24 LAB — D DIMER PPP FEU-MCNC: 0.34 UG/ML(FEU)

## 2020-10-24 PROCEDURE — 73080 X-RAY EXAM OF ELBOW: CPT

## 2020-10-24 PROCEDURE — 85379 FIBRIN DEGRADATION QUANT: CPT

## 2020-10-24 PROCEDURE — 99283 EMERGENCY DEPT VISIT LOW MDM: CPT

## 2020-10-24 NOTE — ED TRIAGE NOTES
Patient reports right arm/elbow pain, denies injury or trauma. Patient reports \"warm\" sensation in right lower arm.  Mask in place

## 2020-10-24 NOTE — ED NOTES
I have reviewed discharge instructions with the patient. The patient verbalized understanding. Patient left ED via Discharge Method: ambulatory to Home with (self). Opportunity for questions and clarification provided. Patient given 0 scripts. No esign         To continue your aftercare when you leave the hospital, you may receive an automated call from our care team to check in on how you are doing. This is a free service and part of our promise to provide the best care and service to meet your aftercare needs.  If you have questions, or wish to unsubscribe from this service please call [phone]. Thank you for Choosing our [facility] Emergency Department.

## 2020-10-24 NOTE — ED PROVIDER NOTES
68-year-old female with right elbow pain. Patient felt something moving her elbow. But she also is concerned that she may have a blood clot she is had a blood clot in his arm for. Arm Pain    The current episode started more than 2 days ago. The problem has not changed since onset. The pain is present in the right elbow. The quality of the pain is described as aching. The pain is at a severity of 4/10. The pain is mild. Associated symptoms include numbness.         Past Medical History:   Diagnosis Date    Allergic rhinitis     Anemia     Arthralgia     Arthritis     Bunion     Chronic pain of both knees 1/23/2017    Closed nondisplaced fracture of middle phalanx of lesser toe of right foot 5/14/2018    Closed nondisplaced fracture of middle phalanx of lesser toe of right foot 5/14/2018 5/5/18    Cough     Depression     Diabetes ([address]     Diabetes mellitus type 2, diet-controlled ([address] 5/14/2018    Disorder of bone and cartilage     Dysphagia     Esophageal reflux     Essential hypertension 1/23/2017    Gastroesophageal reflux disease without esophagitis 1/23/2017    Hx of headache     Hx of lymphadenopathy     Hyperlipemia     Hyperlipidemia     Hypertension     Internal hemorrhoids 4/3/2020    Seen on colonoscopy on 4/2/2020    Irritable bowel syndrome     Medial epicondylitis, right elbow     Menopause     Migraine     Mixed hyperlipidemia 1/23/2017    Nasal congestion     Osteopenia of spine 12/12/2017    Palpitations     Sinusitis     URI (upper respiratory infection)        Past Surgical History:   Procedure Laterality Date    HX BREAST REDUCTION Bilateral     HX CHOLECYSTECTOMY      HX COLONOSCOPY  10/2016    HX HYSTERECTOMY      HX ORTHOPAEDIC      neck surgery    HX OTHER SURGICAL      lung biopsy         Family History:   Problem Relation Age of Onset    Diabetes Mother         had legs amputated at age 79   [facility] Hypertension Mother     Arthritis-rheumatoid Mother     Hypertension Father     Heart Attack Father     Diabetes Sister     Breast Cancer Sister 47    Diabetes Brother     Cataract Brother        Social History     Socioeconomic History    Marital status: SINGLE     Spouse name: Not on file    Number of children: Not on file    Years of education: Not on file    Highest education level: Not on file   Occupational History    Not on file   Social Needs    Financial resource strain: Not on file    Food insecurity     Worry: Not on file     Inability: Not on file    Transportation needs     Medical: Not on file     Non-medical: Not on file   Tobacco Use    Smoking status: Never Smoker    Smokeless tobacco: Never Used   Substance and Sexual Activity    Alcohol use: No    Drug use: Not on file    Sexual activity: Not on file   Lifestyle    Physical activity     Days per week: Not on file     Minutes per session: Not on file    Stress: Not on file   Relationships    Social connections     Talks on phone: Not on file     Gets together: Not on file     Attends Rastafarian service: Not on file     Active member of club or organization: Not on file     Attends meetings of clubs or organizations: Not on file     Relationship status: Not on file    Intimate partner violence     Fear of current or ex partner: Not on file     Emotionally abused: Not on file     Physically abused: Not on file     Forced sexual activity: Not on file   Other Topics Concern    Not on file   Social History Narrative    Not on file         ALLERGIES: Januvia [sitagliptin]    Review of Systems   Constitutional: Negative. Negative for activity change. HENT: Negative. Eyes: Negative. Respiratory: Negative. Cardiovascular: Negative. Gastrointestinal: Negative. Genitourinary: Negative. Musculoskeletal: Negative. Skin: Negative. Neurological: Positive for numbness. Psychiatric/Behavioral: Negative.     All other systems reviewed and are negative. Vitals:    10/24/20 0819   BP: 135/76   Pulse: 73   Resp: 18   Temp: 98 °F (36.7 °C)   SpO2: 99%   Weight: 81.6 kg (180 lb)   Height: 5' 4\" (1.626 m)            Physical Exam  Constitutional:       General: She is not in acute distress. Appearance: She is well-developed. HENT:      Head: Normocephalic and atraumatic. Right Ear: External ear normal.      Left Ear: External ear normal.      Nose: Nose normal.   Eyes:      General: No scleral icterus. Right eye: No discharge. Left eye: No discharge. Conjunctiva/sclera: Conjunctivae normal.      Pupils: Pupils are equal, round, and reactive to light. Neck:      Musculoskeletal: Normal range of motion. Cardiovascular:      Rate and Rhythm: Regular rhythm. Pulmonary:      Effort: Pulmonary effort is normal. No respiratory distress. Breath sounds: Normal breath sounds. No stridor. No wheezing or rales. Abdominal:      General: Bowel sounds are normal. There is no distension. Palpations: Abdomen is soft. Tenderness: There is no abdominal tenderness. Musculoskeletal: Normal range of motion. Skin:     General: Skin is warm and dry. Findings: No rash. Neurological:      Mental Status: She is alert and oriented to person, place, and time. Motor: No abnormal muscle tone. Coordination: Coordination normal.   Psychiatric:         Behavior: Behavior normal.          MDM  Number of Diagnoses or Management Options  Diagnosis management comments: Negative x-ray also negative will have her follow-up with the primary care doctor does not appear to be requiring any intervention at this time. She can use ice in the area of pain and an Ace wrap.        Amount and/or Complexity of Data Reviewed  Clinical lab tests: ordered and reviewed  Tests in the radiology section of CPT®: ordered and reviewed  Tests in the medicine section of CPT®: ordered and reviewed    Risk of Complications, Morbidity, and/or Mortality  Presenting problems: moderate  Diagnostic procedures: moderate  Management options: moderate           Procedures

## 2020-10-24 NOTE — DISCHARGE INSTRUCTIONS
Follow-up with your primary care physician if arm continues to cause her problems or has any swelling.

## 2020-10-27 ENCOUNTER — PATIENT OUTREACH (OUTPATIENT)
Dept: CASE MANAGEMENT | Age: 60
End: 2020-10-27

## 2020-10-28 ENCOUNTER — PATIENT OUTREACH (OUTPATIENT)
Dept: CASE MANAGEMENT | Age: 60
End: 2020-10-28

## 2020-10-28 NOTE — PROGRESS NOTES
Patient contacted regarding recent discharge and COVID-19 risk. Ambulatory Care Manager contacted the patient by telephone to perform post discharge assessment. Verified name and  with patient as identifiers. Patient has following risk factors of: diabetes. CTN/ACM/LPN reviewed discharge instructions, medical action plan and red flags related to discharge diagnosis. Reviewed and educated them on any new and changed medications related to discharge diagnosis. Advised obtaining a 90-day supply of all daily and as-needed medications. Advance Care Planning:   Does patient have an Advance Directive: no    Education provided regarding infection prevention, and signs and symptoms of COVID-19 and when to seek medical attention with patient who verbalized understanding. Discussed exposure protocols and quarantine from [address] you at higher risk for severe illness  and given an opportunity for questions and concerns. The patient agrees to contact the COVID-19 hotline [phone] or PCP office for questions related to their healthcare. CTN/ACM/LPN provided contact information for future reference. From CDC: Are you at higher risk for severe illness?  Wash your hands often.  Avoid close contact (6 feet, which is about two arm lengths) with people who are sick.  Put distance between yourself and other people if COVID-19 is spreading in your community.  Clean and disinfect frequently touched surfaces.  Avoid all cruise travel and non-essential air travel.  Call your healthcare professional if you have concerns about COVID-19 and your underlying condition or if you are sick. For more information on steps you can take to protect yourself, see CDC's How to Protect Yourself      Patient/family/caregiver given information for [facility] and agrees to enroll no      Plan for follow-up call in 7-14 days based on severity of symptoms and risk factors.

## 2020-11-08 PROBLEM — M79.601 RIGHT ARM PAIN: Status: ACTIVE | Noted: 2020-11-08

## 2020-11-10 ENCOUNTER — PATIENT OUTREACH (OUTPATIENT)
Dept: CASE MANAGEMENT | Age: 60
End: 2020-11-10

## 2020-11-10 NOTE — PROGRESS NOTES
Patient resolved from Transition of Care episode on 11/10/20. Patient/family has been provided the following resources and education related to COVID-19:                         Signs, symptoms and red flags related to COVID-19            CDC exposure and quarantine guidelines            [facility]it exposure contact - [phone]            Contact for their local Department of Health                 Patient currently reports that the following symptoms have improved:  arm pain. Pt states she is in quarantine due to being around friend who tested positive approx 8 days ago. RNCM reviewed s/s to report to physician, as well as testing sites. No further outreach scheduled with this ACM. Episode of Care resolved. Patient has this ACM contact information if future needs arise.

## 2021-01-15 PROBLEM — G89.29 CHRONIC RIGHT SHOULDER PAIN: Status: ACTIVE | Noted: 2020-11-08

## 2021-01-15 PROBLEM — R20.0 NUMBNESS AND TINGLING OF RIGHT ARM: Status: ACTIVE | Noted: 2021-01-15

## 2021-01-15 PROBLEM — M25.511 CHRONIC RIGHT SHOULDER PAIN: Status: ACTIVE | Noted: 2020-11-08

## 2021-01-15 PROBLEM — R29.898 RIGHT ARM WEAKNESS: Status: ACTIVE | Noted: 2021-01-15

## 2021-01-15 PROBLEM — R20.2 NUMBNESS AND TINGLING OF RIGHT ARM: Status: ACTIVE | Noted: 2021-01-15

## 2021-02-05 PROBLEM — M54.12 CERVICAL RADICULOPATHY: Status: ACTIVE | Noted: 2021-02-05

## 2021-02-05 PROBLEM — M54.2 NECK PAIN: Status: ACTIVE | Noted: 2021-02-05

## 2021-03-09 PROBLEM — M79.601 RIGHT ARM PAIN: Status: ACTIVE | Noted: 2021-03-09

## 2021-08-17 ENCOUNTER — TRANSCRIBE ORDER (OUTPATIENT)
Dept: SCHEDULING | Age: 61
End: 2021-08-17

## 2021-08-17 DIAGNOSIS — Z12.31 SCREENING MAMMOGRAM, ENCOUNTER FOR: Primary | ICD-10-CM

## 2021-08-28 ENCOUNTER — HOSPITAL ENCOUNTER (OUTPATIENT)
Dept: MAMMOGRAPHY | Age: 61
Discharge: HOME | End: 2021-08-28
Attending: FAMILY MEDICINE
Payer: MEDICARE

## 2021-08-28 DIAGNOSIS — Z12.31 SCREENING MAMMOGRAM, ENCOUNTER FOR: ICD-10-CM

## 2021-08-28 PROCEDURE — 77063 BREAST TOMOSYNTHESIS BI: CPT

## 2021-11-09 ENCOUNTER — TELEPHONE (OUTPATIENT)
Dept: PEDIATRICS CLINIC | Age: 61
End: 2021-11-09

## 2022-03-14 PROBLEM — E11.9 DIABETES MELLITUS TYPE 2, DIET-CONTROLLED (HCC): Chronic | Status: RESOLVED | Noted: 2018-05-14 | Resolved: 2022-03-14

## 2022-03-14 PROBLEM — R20.0 NUMBNESS AND TINGLING OF RIGHT ARM: Status: RESOLVED | Noted: 2021-01-15 | Resolved: 2022-03-14

## 2022-03-14 PROBLEM — M25.511 CHRONIC RIGHT SHOULDER PAIN: Status: RESOLVED | Noted: 2020-11-08 | Resolved: 2022-03-14

## 2022-03-14 PROBLEM — R20.2 NUMBNESS AND TINGLING OF RIGHT ARM: Status: RESOLVED | Noted: 2021-01-15 | Resolved: 2022-03-14

## 2022-03-14 PROBLEM — M79.675 GREAT TOE PAIN, LEFT: Status: RESOLVED | Noted: 2020-01-14 | Resolved: 2022-03-14

## 2022-03-14 PROBLEM — M54.2 NECK PAIN: Status: RESOLVED | Noted: 2021-02-05 | Resolved: 2022-03-14

## 2022-03-14 PROBLEM — N95.1 MENOPAUSE SYNDROME: Status: RESOLVED | Noted: 2018-12-06 | Resolved: 2022-03-14

## 2022-03-14 PROBLEM — F32.A MILD DEPRESSION: Status: RESOLVED | Noted: 2018-12-06 | Resolved: 2022-03-14

## 2022-03-14 PROBLEM — S92.524A CLOSED NONDISPLACED FRACTURE OF MIDDLE PHALANX OF LESSER TOE OF RIGHT FOOT: Chronic | Status: RESOLVED | Noted: 2018-05-14 | Resolved: 2022-03-14

## 2022-03-14 PROBLEM — M79.601 RIGHT ARM PAIN: Status: RESOLVED | Noted: 2021-03-09 | Resolved: 2022-03-14

## 2022-03-14 PROBLEM — Z76.89 ESTABLISHING CARE WITH NEW DOCTOR, ENCOUNTER FOR: Status: RESOLVED | Noted: 2017-01-23 | Resolved: 2022-03-14

## 2022-03-14 PROBLEM — M72.2 PLANTAR FASCIITIS OF LEFT FOOT: Chronic | Status: RESOLVED | Noted: 2017-08-22 | Resolved: 2022-03-14

## 2022-03-14 PROBLEM — G89.29 CHRONIC RIGHT SHOULDER PAIN: Status: RESOLVED | Noted: 2020-11-08 | Resolved: 2022-03-14

## 2022-03-18 PROBLEM — E11.9 DIABETES MELLITUS WITHOUT COMPLICATION (HCC): Status: ACTIVE | Noted: 2018-12-06

## 2022-03-18 PROBLEM — K59.04 CHRONIC IDIOPATHIC CONSTIPATION: Status: ACTIVE | Noted: 2018-01-30

## 2022-03-18 PROBLEM — Z79.899 ENCOUNTER FOR LONG-TERM (CURRENT) USE OF MEDICATIONS: Status: ACTIVE | Noted: 2017-01-23

## 2022-03-19 PROBLEM — F51.01 PRIMARY INSOMNIA: Status: ACTIVE | Noted: 2018-12-06

## 2022-03-19 PROBLEM — K21.9 GASTROESOPHAGEAL REFLUX DISEASE WITHOUT ESOPHAGITIS: Status: ACTIVE | Noted: 2017-01-23

## 2022-03-19 PROBLEM — K44.9 HIATAL HERNIA: Status: ACTIVE | Noted: 2020-01-14

## 2022-03-19 PROBLEM — E66.9 OBESITY (BMI 30-39.9): Status: ACTIVE | Noted: 2019-09-03

## 2022-03-19 PROBLEM — M85.88 OSTEOPENIA OF SPINE: Status: ACTIVE | Noted: 2017-12-12

## 2022-03-19 PROBLEM — I10 ESSENTIAL HYPERTENSION: Status: ACTIVE | Noted: 2017-01-23

## 2022-03-19 PROBLEM — M54.12 CERVICAL RADICULOPATHY: Status: ACTIVE | Noted: 2021-02-05

## 2022-03-19 PROBLEM — E55.9 VITAMIN D DEFICIENCY: Status: ACTIVE | Noted: 2017-05-01

## 2022-03-19 PROBLEM — K22.4 DYSKINESIA OF ESOPHAGUS: Status: ACTIVE | Noted: 2020-01-14

## 2022-03-19 PROBLEM — R13.10 DYSPHAGIA: Status: ACTIVE | Noted: 2020-01-14

## 2022-03-19 PROBLEM — Z82.49 FAM HX-ISCHEM HEART DISEASE: Status: ACTIVE | Noted: 2017-10-30

## 2022-03-19 PROBLEM — R29.898 RIGHT ARM WEAKNESS: Status: ACTIVE | Noted: 2021-01-15

## 2022-03-19 PROBLEM — K64.8 INTERNAL HEMORRHOIDS: Status: ACTIVE | Noted: 2020-04-03

## 2022-03-19 PROBLEM — R63.5 WEIGHT GAIN: Status: ACTIVE | Noted: 2019-09-03

## 2022-03-20 PROBLEM — E78.2 MIXED HYPERLIPIDEMIA: Status: ACTIVE | Noted: 2017-01-23

## 2022-03-20 PROBLEM — G89.29 CHRONIC PAIN OF BOTH KNEES: Status: ACTIVE | Noted: 2017-01-23

## 2022-03-20 PROBLEM — M25.561 CHRONIC PAIN OF BOTH KNEES: Status: ACTIVE | Noted: 2017-01-23

## 2022-03-20 PROBLEM — M25.562 CHRONIC PAIN OF BOTH KNEES: Status: ACTIVE | Noted: 2017-01-23

## 2022-03-25 PROBLEM — K60.1 CHRONIC ANAL FISSURE: Status: ACTIVE | Noted: 2021-11-23

## 2022-03-30 ENCOUNTER — NURSE TRIAGE (OUTPATIENT)
Dept: OTHER | Facility: CLINIC | Age: 62
End: 2022-03-30

## 2022-03-30 NOTE — TELEPHONE ENCOUNTER
Received call from Luis at [facility] with yoone. Subjective: Caller states \"I just feel off\"     Current Symptoms: Seen by PA recently and treated URI with abx and nasal spray. C/O ongoing sinus congestion with thick mucous that is hard to expell, ear pressure, fatigue, loose stools and sore throat. Has not checked her glucose for weeks. Dizziness started today while sitting in her car, looking down at her phone, lasted less than 1 min. Denies N/V. Normal intake and output reported. Onset: 10 days ago - worsened today    Associated Symptoms: reduced appetite    Pain Severity: 0/10; Temperature: Denies    What has been tried: Nothing    LMP: NA Pregnant: NA    Recommended disposition: See PCP within 24 Hours - unable to reach NP León Wellington to discuss symptoms per request from Mariposa Bourgeois [phone] Patient advised to make in office appointment for symptoms and message sent HP to provider. Care advice provided, patient verbalizes understanding; denies any other questions or concerns; instructed to call back for any new or worsening symptoms. Patient/Caller agrees with recommended disposition; writer provided warm transfer to Giovani jai at [facility] for appointment scheduling    Attention Provider: Thank you for allowing me to participate in the care of your patient. The patient was connected to triage in response to information provided to the [facility]. Please do not respond through this encounter as the response is not directed to a shared pool.       Reason for Disposition   [1] NO dizziness now AND [2] one or more stroke risk factors (i.e., hypertension, diabetes, prior stroke/TIA/heart attack)    Protocols used: DIZZINESS - VERTIGO-ADULT-

## 2022-06-10 ENCOUNTER — TELEMEDICINE (OUTPATIENT)
Dept: FAMILY MEDICINE CLINIC | Facility: CLINIC | Age: 62
End: 2022-06-10
Payer: MEDICARE

## 2022-06-10 DIAGNOSIS — R53.83 OTHER FATIGUE: ICD-10-CM

## 2022-06-10 DIAGNOSIS — R51.9 HEADACHE, UNSPECIFIED HEADACHE TYPE: ICD-10-CM

## 2022-06-10 DIAGNOSIS — U07.1 LAB TEST POSITIVE FOR DETECTION OF COVID-19 VIRUS: Primary | ICD-10-CM

## 2022-06-10 PROCEDURE — 99443 PR PHYS/QHP TELEPHONE EVALUATION 21-30 MIN: CPT | Performed by: FAMILY MEDICINE

## 2022-06-10 NOTE — PROGRESS NOTES
SUBJECTIVE:   Americo Patterson is a 57 y.o. female who has a past medical history significant for hypertension, diabetes, high cholesterol, allergic rhinitis and degenerative arthritis. Patient presents today for virtual visit via telephone encounter. Patient reports that she began to feel poorly on Tuesday with symptoms of fatigue, low back pain, headache. She did a home COVID test that was positive. She went to an urgent care center on Wednesday and got tested again and it was positive. She states that her symptoms are not worsening. She is COVID vaccinated. She reports no fever, chest pain or shortness of breath. Patient's vital signs were not performed as encounter was performed virtually. Location of virtual visit was patient's home. HPI  See above    Past Medical History, Past Surgical History, Family history, Social History, and Medications were all reviewed with the patient today and updated as necessary. Current Outpatient Medications   Medication Sig Dispense Refill    ZINC PO Take by mouth      calcium carbonate 1500 (600 Ca) MG TABS tablet Take 600 mg by mouth 2 times daily      metFORMIN (GLUCOPHAGE-XR) 500 MG extended release tablet Take 1,000 mg by mouth Daily with supper      metoprolol tartrate (LOPRESSOR) 25 MG tablet TAKE 1 TABLET BY MOUTH TWICE A DAY      simvastatin (ZOCOR) 40 MG tablet Take 40 mg by mouth      triamterene-hydroCHLOROthiazide (DYAZIDE) 37.5-25 MG per capsule TAKE 1 CAPSULE BY MOUTH EVERY DAY       No current facility-administered medications for this visit.      Allergies   Allergen Reactions    Sitagliptin Other (See Comments)     Joint pain, abdominal pain, low back pain     Patient Active Problem List   Diagnosis    Diabetes mellitus without complication [address]    Chronic idiopathic constipation    Encounter for long-term (current) use of medications    Osteopenia of spine    Dysphagia    Hiatal hernia    Primary insomnia    Cervical radiculopathy    Weight gain    Right arm weakness    Internal hemorrhoids    Essential hypertension    Obesity (BMI 30-39. 9)    Gastroesophageal reflux disease without esophagitis    Dyskinesia of esophagus    Vitamin D deficiency    Fam hx-ischem heart disease    Mixed hyperlipidemia    Chronic pain of both knees    Chronic anal fissure     Past Medical History:   Diagnosis Date    Allergic rhinitis     Anemia     Arthralgia     Arthritis     Bunion     Chronic pain of both knees 1/23/2017    Closed nondisplaced fracture of middle phalanx of lesser toe of right foot 5/14/2018    Closed nondisplaced fracture of middle phalanx of lesser toe of right foot 5/14/2018 5/5/18    Cough     Depression     Diabetes ([facility]     Diabetes mellitus type 2, diet-controlled ([facility] 5/14/2018    Disorder of bone and cartilage     Dysphagia     Esophageal reflux     Essential hypertension 1/23/2017    Gastroesophageal reflux disease without esophagitis 1/23/2017    Hx of headache     Hx of lymphadenopathy     Hyperlipemia     Hyperlipidemia     Hypertension     Internal hemorrhoids 4/3/2020    Seen on colonoscopy on 4/2/2020    Irritable bowel syndrome     Medial epicondylitis, right elbow     Menopause     Migraine     Mixed hyperlipidemia 1/23/2017    Nasal congestion     Neck pain     Osteopenia of spine 12/12/2017    Palpitations     Right arm pain     Sinusitis     URI (upper respiratory infection)      Past Surgical History:   Procedure Laterality Date    BREAST REDUCTION SURGERY Bilateral     CHOLECYSTECTOMY      COLONOSCOPY  10/2016    HYSTERECTOMY      ORTHOPEDIC SURGERY      neck surgery    OTHER SURGICAL HISTORY      lung biopsy     Family History   Problem Relation Age of Onset    Cataracts Brother     Diabetes Brother     Breast Cancer Sister 47    Diabetes Sister     Heart Attack Father     Hypertension Father     Diabetes Mother         had legs amputated at age 79    Hypertension Mother     Rheum Arthritis Mother      Social History     Tobacco Use    Smoking status: Never Smoker    Smokeless tobacco: Never Used   Substance Use Topics    Alcohol use: Yes         Review of Systems  See above    OBJECTIVE:  There were no vitals taken for this visit. Physical Exam    Medical problems and test results were reviewed with the patient today. ASSESSMENT and PLAN    1. Positive COVID infection. Patient given instructions according to CDC guidelines for isolation. She will monitor for worsening symptoms and these were outlined with the patient with instructions on what to do in the event that they occur. Supportive care with over-the-counter analgesics, vitamin C, vitamin D, zinc, plain Mucinex and as needed Delsym. 2.  Fatigue. As above. Monitor for worsening symptoms. 3.  Headache. As above. Consent: This patient and/or their healthcare decision maker is aware that this patient-initiated Telehealth encounter is a billable service, with coverage as determined by their insurance carrier. Patient is aware that they may receive a bill and has provided verbal consent to proceed: Yes    I was in the officewhile conducting this encounter. Patient was at home. This virtual visit was conducted telephone encounter only. -  I affirm this is a Patient Initiated Episode with an Established Patient who has not had a related appointment within my department in the past 7 days or scheduled within the next 24 hours. Note: this encounter is not billable if this call serves to triage the patient into an appointment for the relevant concern. On this date 6/10/2022 I have spent 22 minutes reviewing previous notes, test results and face to face with the patient discussing the diagnosis and importance of compliance with the treatment plan as well as documenting on the day of the visit.  Greater than 50% of this visit was spent counseling the patient about test results, prognosis, importance of compliance, education about disease process, benefits of medications, instructions for management of acute symptoms, and follow up plans.

## 2022-09-04 DIAGNOSIS — M85.88 OTHER SPECIFIED DISORDERS OF BONE DENSITY AND STRUCTURE, OTHER SITE: ICD-10-CM

## 2022-09-05 DIAGNOSIS — E78.2 MIXED HYPERLIPIDEMIA: ICD-10-CM

## 2022-09-05 DIAGNOSIS — E11.9 TYPE 2 DIABETES MELLITUS WITHOUT COMPLICATIONS (HCC): ICD-10-CM

## 2022-09-12 RX ORDER — SIMVASTATIN 40 MG
TABLET ORAL
Qty: 30 TABLET | Refills: 0 | Status: SHIPPED | OUTPATIENT
Start: 2022-09-12 | End: 2022-10-05

## 2022-09-12 RX ORDER — METFORMIN HYDROCHLORIDE 500 MG/1
TABLET, EXTENDED RELEASE ORAL
Qty: 60 TABLET | Refills: 0 | Status: SHIPPED | OUTPATIENT
Start: 2022-09-12 | End: 2022-10-05

## 2022-09-17 DIAGNOSIS — I10 ESSENTIAL (PRIMARY) HYPERTENSION: ICD-10-CM

## 2022-10-05 DIAGNOSIS — E11.9 TYPE 2 DIABETES MELLITUS WITHOUT COMPLICATIONS (HCC): ICD-10-CM

## 2022-10-05 DIAGNOSIS — E78.2 MIXED HYPERLIPIDEMIA: ICD-10-CM

## 2022-10-05 RX ORDER — METFORMIN HYDROCHLORIDE 500 MG/1
TABLET, EXTENDED RELEASE ORAL
Qty: 60 TABLET | Refills: 0 | Status: SHIPPED | OUTPATIENT
Start: 2022-10-05

## 2022-10-05 RX ORDER — SIMVASTATIN 40 MG
TABLET ORAL
Qty: 30 TABLET | Refills: 0 | Status: SHIPPED | OUTPATIENT
Start: 2022-10-05

## 2022-12-02 DIAGNOSIS — E11.9 TYPE 2 DIABETES MELLITUS WITHOUT COMPLICATIONS (HCC): ICD-10-CM

## 2022-12-02 DIAGNOSIS — E78.2 MIXED HYPERLIPIDEMIA: ICD-10-CM

## 2022-12-02 RX ORDER — METFORMIN HYDROCHLORIDE 500 MG/1
TABLET, EXTENDED RELEASE ORAL
Qty: 60 TABLET | Refills: 0 | Status: SHIPPED | OUTPATIENT
Start: 2022-12-02

## 2022-12-02 RX ORDER — SIMVASTATIN 40 MG
TABLET ORAL
Qty: 30 TABLET | Refills: 0 | Status: SHIPPED | OUTPATIENT
Start: 2022-12-02

## 2022-12-12 ENCOUNTER — OFFICE VISIT (OUTPATIENT)
Dept: FAMILY MEDICINE CLINIC | Facility: CLINIC | Age: 62
End: 2022-12-12
Payer: MEDICARE

## 2022-12-12 VITALS
OXYGEN SATURATION: 100 % | WEIGHT: 182.8 LBS | HEIGHT: 64 IN | HEART RATE: 79 BPM | BODY MASS INDEX: 31.21 KG/M2 | SYSTOLIC BLOOD PRESSURE: 118 MMHG | DIASTOLIC BLOOD PRESSURE: 76 MMHG

## 2022-12-12 DIAGNOSIS — R05.2 SUBACUTE COUGH: ICD-10-CM

## 2022-12-12 DIAGNOSIS — E11.9 TYPE 2 DIABETES MELLITUS WITHOUT COMPLICATION, WITHOUT LONG-TERM CURRENT USE OF INSULIN (HCC): ICD-10-CM

## 2022-12-12 DIAGNOSIS — E78.2 MIXED HYPERLIPIDEMIA: ICD-10-CM

## 2022-12-12 DIAGNOSIS — R09.82 POSTNASAL DRIP: ICD-10-CM

## 2022-12-12 DIAGNOSIS — L65.9 HAIR LOSS: ICD-10-CM

## 2022-12-12 DIAGNOSIS — Z00.00 MEDICARE ANNUAL WELLNESS VISIT, SUBSEQUENT: Primary | ICD-10-CM

## 2022-12-12 DIAGNOSIS — Z12.31 SCREENING MAMMOGRAM FOR BREAST CANCER: ICD-10-CM

## 2022-12-12 DIAGNOSIS — I10 PRIMARY HYPERTENSION: ICD-10-CM

## 2022-12-12 LAB
BASOPHILS # BLD: 0 K/UL (ref 0–0.2)
BASOPHILS NFR BLD: 0 % (ref 0–2)
DIFFERENTIAL METHOD BLD: ABNORMAL
EOSINOPHIL # BLD: 0.2 K/UL (ref 0–0.8)
EOSINOPHIL NFR BLD: 3 % (ref 0.5–7.8)
ERYTHROCYTE [DISTWIDTH] IN BLOOD BY AUTOMATED COUNT: 15.3 % (ref 11.9–14.6)
HCT VFR BLD AUTO: 37.4 % (ref 35.8–46.3)
HGB BLD-MCNC: 11.1 G/DL (ref 11.7–15.4)
IMM GRANULOCYTES # BLD AUTO: 0 K/UL (ref 0–0.5)
IMM GRANULOCYTES NFR BLD AUTO: 0 % (ref 0–5)
LYMPHOCYTES # BLD: 2.3 K/UL (ref 0.5–4.6)
LYMPHOCYTES NFR BLD: 35 % (ref 13–44)
MCH RBC QN AUTO: 24.4 PG (ref 26.1–32.9)
MCHC RBC AUTO-ENTMCNC: 29.7 G/DL (ref 31.4–35)
MCV RBC AUTO: 82.2 FL (ref 82–102)
MICROALB/CREAT RATIO, POC: NORMAL
MICROALBUMIN URINE, POC: NORMAL MG/L
MONOCYTES # BLD: 0.5 K/UL (ref 0.1–1.3)
MONOCYTES NFR BLD: 7 % (ref 4–12)
NEUTS SEG # BLD: 3.6 K/UL (ref 1.7–8.2)
NEUTS SEG NFR BLD: 55 % (ref 43–78)
NRBC # BLD: 0 K/UL (ref 0–0.2)
PLATELET # BLD AUTO: 263 K/UL (ref 150–450)
PMV BLD AUTO: 12.4 FL (ref 9.4–12.3)
RBC # BLD AUTO: 4.55 M/UL (ref 4.05–5.2)
WBC # BLD AUTO: 6.7 K/UL (ref 4.3–11.1)

## 2022-12-12 PROCEDURE — 3078F DIAST BP <80 MM HG: CPT | Performed by: FAMILY MEDICINE

## 2022-12-12 PROCEDURE — G8427 DOCREV CUR MEDS BY ELIG CLIN: HCPCS | Performed by: FAMILY MEDICINE

## 2022-12-12 PROCEDURE — G0439 PPPS, SUBSEQ VISIT: HCPCS | Performed by: FAMILY MEDICINE

## 2022-12-12 PROCEDURE — 2022F DILAT RTA XM EVC RTNOPTHY: CPT | Performed by: FAMILY MEDICINE

## 2022-12-12 PROCEDURE — 3074F SYST BP LT 130 MM HG: CPT | Performed by: FAMILY MEDICINE

## 2022-12-12 PROCEDURE — 99213 OFFICE O/P EST LOW 20 MIN: CPT | Performed by: FAMILY MEDICINE

## 2022-12-12 PROCEDURE — 3017F COLORECTAL CA SCREEN DOC REV: CPT | Performed by: FAMILY MEDICINE

## 2022-12-12 PROCEDURE — 3044F HG A1C LEVEL LT 7.0%: CPT | Performed by: FAMILY MEDICINE

## 2022-12-12 PROCEDURE — 1036F TOBACCO NON-USER: CPT | Performed by: FAMILY MEDICINE

## 2022-12-12 PROCEDURE — 82043 UR ALBUMIN QUANTITATIVE: CPT | Performed by: FAMILY MEDICINE

## 2022-12-12 PROCEDURE — G8484 FLU IMMUNIZE NO ADMIN: HCPCS | Performed by: FAMILY MEDICINE

## 2022-12-12 PROCEDURE — G8417 CALC BMI ABV UP PARAM F/U: HCPCS | Performed by: FAMILY MEDICINE

## 2022-12-12 RX ORDER — METFORMIN HYDROCHLORIDE 500 MG/1
TABLET, EXTENDED RELEASE ORAL
Qty: 180 TABLET | Refills: 3 | Status: SHIPPED | OUTPATIENT
Start: 2022-12-12

## 2022-12-12 RX ORDER — TRIAMTERENE AND HYDROCHLOROTHIAZIDE 37.5; 25 MG/1; MG/1
1 CAPSULE ORAL DAILY
Qty: 90 CAPSULE | Refills: 3 | Status: SHIPPED | OUTPATIENT
Start: 2022-12-12

## 2022-12-12 RX ORDER — SIMVASTATIN 40 MG
TABLET ORAL
Qty: 90 TABLET | Refills: 3 | Status: SHIPPED | OUTPATIENT
Start: 2022-12-12

## 2022-12-12 RX ORDER — SACROSIDASE 8500 [IU]/ML
SOLUTION ORAL
COMMUNITY
Start: 2022-09-26

## 2022-12-12 ASSESSMENT — LIFESTYLE VARIABLES
HOW OFTEN DO YOU HAVE A DRINK CONTAINING ALCOHOL: MONTHLY OR LESS
HOW MANY STANDARD DRINKS CONTAINING ALCOHOL DO YOU HAVE ON A TYPICAL DAY: 1 OR 2

## 2022-12-12 ASSESSMENT — PATIENT HEALTH QUESTIONNAIRE - PHQ9
SUM OF ALL RESPONSES TO PHQ QUESTIONS 1-9: 0
1. LITTLE INTEREST OR PLEASURE IN DOING THINGS: 0
SUM OF ALL RESPONSES TO PHQ9 QUESTIONS 1 & 2: 0
SUM OF ALL RESPONSES TO PHQ QUESTIONS 1-9: 0
2. FEELING DOWN, DEPRESSED OR HOPELESS: 0
SUM OF ALL RESPONSES TO PHQ QUESTIONS 1-9: 0
SUM OF ALL RESPONSES TO PHQ QUESTIONS 1-9: 0

## 2022-12-12 NOTE — PROGRESS NOTES
SUBJECTIVE:   Adan White is a 57 y.o. female who has a past medical history significant for hypertension, diabetes and high cholesterol. Review of systems reveals that she has been experiencing for over a year thinning of her hair. This is becoming cosmetically frustrating for her. She reports no changes in dyes or chemicals that she uses of her hair. She states that she has been going to the same nutrition for 30 years. Patient reports in addition a nonproductive cough for about a month. She reports \"having a lot of phlegm in my throat\". No fever, hemoptysis, chest pain or shortness of breath. No orthopnea or PND. Current medicines are listed in the EMR and reviewed today. HPI  See above    Past Medical History, Past Surgical History, Family history, Social History, and Medications were all reviewed with the patient today and updated as necessary. Current Outpatient Medications   Medication Sig Dispense Refill    simvastatin (ZOCOR) 40 MG tablet TAKE 1 TABLET BY MOUTH NIGHTLY. INDICATIONS: HIGH AMOUNT OF TRIGLYCERIDE IN THE BLOOD 90 tablet 3    metFORMIN (GLUCOPHAGE-XR) 500 MG extended release tablet TAKE 2 TABLETS BY MOUTH DAILY (WITH DINNER). 180 tablet 3    metoprolol tartrate (LOPRESSOR) 25 MG tablet Take 1 tablet by mouth daily 90 tablet 3    triamterene-hydroCHLOROthiazide (DYAZIDE) 37.5-25 MG per capsule Take 1 capsule by mouth daily 90 capsule 3    calcium carbonate 1500 (600 Ca) MG TABS tablet Take 1 tablet by mouth 2 times daily 180 tablet 3    ZINC PO Take by mouth      SUCRAID 8500 UNIT/ML SOLN        No current facility-administered medications for this visit.      Allergies   Allergen Reactions    Sitagliptin Other (See Comments)     Joint pain, abdominal pain, low back pain     Patient Active Problem List   Diagnosis    Diabetes mellitus without complication (HCC)    Chronic idiopathic constipation    Encounter for long-term (current) use of medications    Osteopenia of spine Dysphagia    Hiatal hernia    Primary insomnia    Cervical radiculopathy    Weight gain    Right arm weakness    Internal hemorrhoids    Essential hypertension    Obesity (BMI 30-39. 9)    Gastroesophageal reflux disease without esophagitis    Dyskinesia of esophagus    Vitamin D deficiency    Fam hx-ischem heart disease    Mixed hyperlipidemia    Chronic pain of both knees    Chronic anal fissure     Past Medical History:   Diagnosis Date    Allergic rhinitis     Anemia     Arthralgia     Arthritis     Bunion     Chronic pain of both knees 1/23/2017    Closed nondisplaced fracture of middle phalanx of lesser toe of right foot 5/14/2018    Closed nondisplaced fracture of middle phalanx of lesser toe of right foot 5/14/2018 5/5/18    Cough     Depression     Diabetes ([facility] [address]     Diabetes mellitus type 2, diet-controlled ([facility] [address] 5/14/2018    Disorder of bone and cartilage     Dysphagia     Esophageal reflux     Essential hypertension 1/23/2017    Gastroesophageal reflux disease without esophagitis 1/23/2017    Hx of headache     Hx of lymphadenopathy     Hyperlipemia     Hyperlipidemia     Hypertension     Internal hemorrhoids 4/3/2020    Seen on colonoscopy on 4/2/2020    Irritable bowel syndrome     Medial epicondylitis, right elbow     Menopause     Migraine     Mixed hyperlipidemia 1/23/2017    Nasal congestion     Neck pain     Osteopenia of spine 12/12/2017    Palpitations     Right arm pain     Sinusitis     URI (upper respiratory infection)      Past Surgical History:   Procedure Laterality Date    BREAST REDUCTION SURGERY Bilateral     CHOLECYSTECTOMY      COLONOSCOPY  10/2016    HYSTERECTOMY (CERVIX STATUS UNKNOWN)      ORTHOPEDIC SURGERY      neck surgery    OTHER SURGICAL HISTORY      lung biopsy     Family History   Problem Relation Age of Onset    Cataracts Brother     Diabetes Brother     Breast Cancer Sister 47    Diabetes Sister     Heart Attack Father     Hypertension Father     Diabetes Mother had legs amputated at age 79    Hypertension Mother     Rheum Arthritis Mother      Social History     Tobacco Use    Smoking status: Never    Smokeless tobacco: Never   Substance Use Topics    Alcohol use: Yes         Review of Systems  See above    OBJECTIVE:  /76   Pulse 79   Ht 5' 4\" (1.626 m)   Wt 182 lb 12.8 oz (82.9 kg)   SpO2 100%   BMI 31.38 kg/m²      Physical Exam  Vitals reviewed. Exam conducted with a chaperone present. Constitutional:       General: She is not in acute distress. Appearance: Normal appearance. HENT:      Head: Normocephalic and atraumatic. Right Ear: Tympanic membrane, ear canal and external ear normal. There is no impacted cerumen. Left Ear: Tympanic membrane, ear canal and external ear normal. There is no impacted cerumen. Nose: Nose normal.      Mouth/Throat:      Mouth: Mucous membranes are moist.      Pharynx: Oropharynx is clear. No oropharyngeal exudate or posterior oropharyngeal erythema. Comments: OP reveals clear postnasal drip. No exudate. Eyes:      General: No scleral icterus. Extraocular Movements: Extraocular movements intact. Conjunctiva/sclera: Conjunctivae normal.      Pupils: Pupils are equal, round, and reactive to light. Neck:      Vascular: No carotid bruit. Cardiovascular:      Rate and Rhythm: Normal rate and regular rhythm. Pulses: Normal pulses. Heart sounds: Normal heart sounds. No murmur heard. Pulmonary:      Effort: Pulmonary effort is normal. No respiratory distress. Breath sounds: Normal breath sounds. No wheezing or rhonchi. Chest:   Breasts:     Right: Normal. No swelling, inverted nipple, mass, nipple discharge, skin change or tenderness. Left: Normal. No swelling, inverted nipple, mass, nipple discharge, skin change or tenderness. Comments: Scarring bilaterally from previous breast reduction noted. Abdominal:      General: Abdomen is flat.  Bowel sounds are normal. There is no distension. Palpations: Abdomen is soft. There is no mass. Tenderness: There is no abdominal tenderness. There is no guarding or rebound. Hernia: No hernia is present. Musculoskeletal:         General: Normal range of motion. Cervical back: Normal range of motion and neck supple. Right lower leg: No edema. Left lower leg: No edema. Lymphadenopathy:      Cervical: No cervical adenopathy. Skin:     General: Skin is warm. Findings: No rash. Neurological:      General: No focal deficit present. Mental Status: She is alert and oriented to person, place, and time. Cranial Nerves: No cranial nerve deficit. Deep Tendon Reflexes: Reflexes normal.   Psychiatric:         Mood and Affect: Mood normal.         Behavior: Behavior normal.         Thought Content: Thought content normal.         Judgment: Judgment normal.     Medical problems and test results were reviewed with the patient today. ASSESSMENT and PLAN    1. Hair loss. Etiology unclear. Check TSH, CBC and metabolic panel. Suggested patient try biotin and Rogaine. Reevaluate at follow-up. 2.  Cough. Appears to have postnasal drip. Lungs are clear. 3.  Postnasal drip. Over-the-counter Claritin. No cough persist she will let me know. 4.  Hypertension. /76. Check metabolic panel, TSH and CBC. 5.  Diabetes. Check A1c and microalbumin. Offered flu vaccine. Patient respectfully declines. Pneumococcal vaccine up-to-date. 6.  High cholesterol. Check lipid panel. Continue statin. 7.  Health maintenance. Refer for screening mammography. Bone density and colonoscopy are up-to-date. Elements of this note have been dictated using speech recognition software. As a result, errors of speech recognition may have occurred.

## 2022-12-12 NOTE — PATIENT INSTRUCTIONS
Advance Directives: Care Instructions  Overview  An advance directive is a legal way to state your wishes at the end of your life. It tells your family and your doctor what to do if you can't say what you want. There are two main types of advance directives. You can change them any time your wishes change. Living will. This form tells your family and your doctor your wishes about life support and other treatment. The form is also called a declaration. Medical power of . This form lets you name a person to make treatment decisions for you when you can't speak for yourself. This person is called a health care agent (health care proxy, health care surrogate). The form is also called a durable power of  for health care. If you do not have an advance directive, decisions about your medical care may be made by a family member, or by a doctor or a  who doesn't know you. It may help to think of an advance directive as a gift to the people who care for you. If you have one, they won't have to make tough decisions by themselves. For more information, including forms for your state, see the [address] website (www.caringinfo.org/planning/advance-directives/). Follow-up care is a key part of your treatment and safety. Be sure to make and go to all appointments, and call your doctor if you are having problems. It's also a good idea to know your test results and keep a list of the medicines you take. What should you include in an advance directive? Many states have a unique advance directive form. (It may ask you to address specific issues.) Or you might use a universal form that's approved by many states. If your form doesn't tell you what to address, it may be hard to know what to include in your advance directive. Use the questions below to help you get started. Who do you want to make decisions about your medical care if you are not able to?   What life-support measures do you want if you have a serious illness that gets worse over time or can't be cured? What are you most afraid of that might happen? (Maybe you're afraid of having pain, losing your independence, or being kept alive by machines.)  Where would you prefer to die? (Your home? A hospital? A nursing home?)  Do you want to donate your organs when you die? Do you want certain Restoration practices performed before you die? When should you call for help? Be sure to contact your doctor if you have any questions. Where can you learn more? Go to http://www.black.com/ and enter R264 to learn more about \"Advance Directives: Care Instructions. \"  Current as of: June 16, 2022               Content Version: 13.5  © 1230-3046 Healthwise, Incorporated. Care instructions adapted under license by [facility] ([facility]). If you have questions about a medical condition or this instruction, always ask your healthcare professional. [place] [address] any warranty or liability for your use of this information. Personalized Preventive Plan for Prema Rdz - 12/12/2022  Medicare offers a range of preventive health benefits. Some of the tests and screenings are paid in full while other may be subject to a deductible, co-insurance, and/or copay. Some of these benefits include a comprehensive review of your medical history including lifestyle, illnesses that may run in your family, and various assessments and screenings as appropriate. After reviewing your medical record and screening and assessments performed today your provider may have ordered immunizations, labs, imaging, and/or referrals for you. A list of these orders (if applicable) as well as your Preventive Care list are included within your After Visit Summary for your review.     Other Preventive Recommendations:    A preventive eye exam performed by an eye specialist is recommended every 1-2 years to screen for glaucoma; cataracts, macular degeneration, and other eye disorders. A preventive dental visit is recommended every 6 months. Try to get at least 150 minutes of exercise per week or 10,000 steps per day on a pedometer . Order or download the FREE \"Exercise & Physical Activity: Your Everyday Guide\" from [employer]. Call 8-743.612.3402 or search [employer] online. You need 0770-8690 mg of calcium and 8262-8371 IU of vitamin D per day. It is possible to meet your calcium requirement with diet alone, but a vitamin D supplement is usually necessary to meet this goal.  When exposed to the sun, use a sunscreen that protects against both UVA and UVB radiation with an SPF of 30 or greater. Reapply every 2 to 3 hours or after sweating, drying off with a towel, or swimming. Always wear a seat belt when traveling in a car. Always wear a helmet when riding a bicycle or motorcycle.

## 2022-12-12 NOTE — PROGRESS NOTES
Medicare Annual Wellness Visit    Trisha Guzmán is here for Medicare AWV (Subsequent)    Assessment & Plan   Medicare annual wellness visit, subsequent  Mixed hyperlipidemia  The following orders have not been finalized:  -     simvastatin (ZOCOR) 40 MG tablet  Type 2 diabetes mellitus without complications ([address]  The following orders have not been finalized:  -     metFORMIN (GLUCOPHAGE-XR) 500 MG extended release tablet      Recommendations for Preventive Services Due: see orders and patient instructions/AVS.  Recommended screening schedule for the next 5-10 years is provided to the patient in written form: see Patient Instructions/AVS.     Return for Medicare Annual Wellness Visit in 1 year. Subjective   The following acute and/or chronic problems were also addressed today:  Hypertension, diabetes and high cholesterol    Patient's complete Health Risk Assessment and screening values have been reviewed and are found in Flowsheets. The following problems were reviewed today and where indicated follow up appointments were made and/or referrals ordered. Positive Risk Factor Screenings with Interventions:       Cognitive:    Words recalled: 2 Words Recalled           Total Score Interpretation: Abnormal Mini-Cog      Interventions:  Encourage proper diet and exercise  See AVS for additional education material  See A/P for plan and any pertinent orders             Weight and Activity:  Physical Activity: Inactive    Days of Exercise per Week: 0 days    Minutes of Exercise per Session: 0 min     On average, how many days per week do you engage in moderate to strenuous exercise (like a brisk walk)?: 0 days  Have you lost any weight without trying in the past 3 months?: No  Body mass index: (!) 31.37      Inactivity Interventions:  Encourage proper diet and exercise as  See AVS for additional education material  See A/P for plan and any pertinent orders  Obesity Interventions:  Encourage proper diet and exercise  See AVS for additional education material  See A/P for plan and any pertinent orders             Safety:  Do you have either shower bars, grab bars, non-slip mats or non-slip surfaces in your shower or bathtub?: (!) No  Interventions:  Patient declined any further interventions or treatment     Advanced Directives:  Do you have a Living Will?: (!) No    Intervention:  has NO advanced directive - information provided                       Objective   Vitals:    12/12/22 1523   BP: 118/76   Pulse: 79   SpO2: 100%   Weight: 182 lb 12.8 oz (82.9 kg)   Height: 5' 4\" (1.626 m)      Body mass index is 31.38 kg/m².         Physical Exam  General Appearance: alert and oriented to person, place and time, well developed and well- nourished, in no acute distress  Skin: warm and dry, no rash or erythema  Head: normocephalic and atraumatic  Eyes: pupils equal, round, and reactive to light, extraocular eye movements intact, conjunctivae normal  ENT: tympanic membrane, external ear and ear canal normal bilaterally, nose without deformity, nasal mucosa and turbinates normal without polyps  Neck: supple and non-tender without mass, no thyromegaly or thyroid nodules, no cervical lymphadenopathy  Pulmonary/Chest: clear to auscultation bilaterally- no wheezes, rales or rhonchi, normal air movement, no respiratory distress  Cardiovascular: normal rate, regular rhythm, normal S1 and S2, no murmurs, rubs, clicks, or gallops, distal pulses intact, no carotid bruits  Abdomen: soft, non-tender, non-distended, normal bowel sounds, no masses or organomegaly  Extremities: no cyanosis, clubbing or edema  Musculoskeletal: normal range of motion, no joint swelling, deformity or tenderness  Neurologic: reflexes normal and symmetric, no cranial nerve deficit, gait, coordination and speech normal       Allergies   Allergen Reactions    Sitagliptin Other (See Comments)     Joint pain, abdominal pain, low back pain     Prior to Visit Medications    Medication Sig Taking? Authorizing Provider   simvastatin (ZOCOR) 40 MG tablet TAKE 1 TABLET BY MOUTH NIGHTLY. INDICATIONS: HIGH AMOUNT OF TRIGLYCERIDE IN THE BLOOD Yes CHESTER Kimball CNP   metFORMIN (GLUCOPHAGE-XR) 500 MG extended release tablet TAKE 2 TABLETS BY MOUTH DAILY (WITH DINNER).  Yes CHESTER Kimball CNP   metoprolol tartrate (LOPRESSOR) 25 MG tablet Take 1 tablet by mouth 2 times daily A VISIT WITH PHYSICIAN IS REQUIRED BEFORE FUTURE REFILLS ARE GIVEN Yes Ariadna Moreno MD   calcium carbonate 1500 (600 Ca) MG TABS tablet Take 1 tablet by mouth 2 times daily Yes Ariadna Moreno MD   ZINC PO Take by mouth Yes Ar Automatic Reconciliation   triamterene-hydroCHLOROthiazide (DYAZIDE) 37.5-25 MG per capsule TAKE 1 CAPSULE BY MOUTH EVERY DAY Yes Ar Automatic Reconciliation   SUCRAID 8500 UNIT/ML SOLN   Historical Provider, MD Ontiveros (Including outside providers/suppliers regularly involved in providing care):   Patient Care Team:  Arianda Moreno MD as PCP - Enedina Doan MD as PCP - [facility] Empaneled Provider  Maria R Young MD as Surgeon     Reviewed and updated this visit:  Tobacco  Meds  Med Hx  Surg Hx  Soc Hx  Fam Hx

## 2022-12-13 LAB
ALBUMIN SERPL-MCNC: 3.6 G/DL (ref 3.2–4.6)
ALBUMIN/GLOB SERPL: 0.9 {RATIO} (ref 0.4–1.6)
ALP SERPL-CCNC: 80 U/L (ref 50–136)
ALT SERPL-CCNC: 15 U/L (ref 12–65)
ANION GAP SERPL CALC-SCNC: 8 MMOL/L (ref 2–11)
AST SERPL-CCNC: 11 U/L (ref 15–37)
BILIRUB SERPL-MCNC: 0.2 MG/DL (ref 0.2–1.1)
BUN SERPL-MCNC: 13 MG/DL (ref 8–23)
CALCIUM SERPL-MCNC: 9.5 MG/DL (ref 8.3–10.4)
CHLORIDE SERPL-SCNC: 103 MMOL/L (ref 101–110)
CHOLEST SERPL-MCNC: 121 MG/DL
CO2 SERPL-SCNC: 29 MMOL/L (ref 21–32)
CREAT SERPL-MCNC: 0.7 MG/DL (ref 0.6–1)
EST. AVERAGE GLUCOSE BLD GHB EST-MCNC: 146 MG/DL
GLOBULIN SER CALC-MCNC: 3.9 G/DL (ref 2.8–4.5)
GLUCOSE SERPL-MCNC: 165 MG/DL (ref 65–100)
HBA1C MFR BLD: 6.7 % (ref 4.8–5.6)
HDLC SERPL-MCNC: 52 MG/DL (ref 40–60)
HDLC SERPL: 2.3 {RATIO}
LDLC SERPL CALC-MCNC: 54.6 MG/DL
POTASSIUM SERPL-SCNC: 3.7 MMOL/L (ref 3.5–5.1)
PROT SERPL-MCNC: 7.5 G/DL (ref 6.3–8.2)
SODIUM SERPL-SCNC: 140 MMOL/L (ref 133–143)
TRIGL SERPL-MCNC: 72 MG/DL (ref 35–150)
TSH, 3RD GENERATION: 1.55 UIU/ML (ref 0.36–3.74)
VLDLC SERPL CALC-MCNC: 14.4 MG/DL (ref 6–23)

## 2022-12-31 DIAGNOSIS — E11.9 TYPE 2 DIABETES MELLITUS WITHOUT COMPLICATION, WITHOUT LONG-TERM CURRENT USE OF INSULIN (HCC): ICD-10-CM

## 2023-01-02 DIAGNOSIS — E78.2 MIXED HYPERLIPIDEMIA: ICD-10-CM

## 2023-01-03 RX ORDER — METFORMIN HYDROCHLORIDE 500 MG/1
TABLET, EXTENDED RELEASE ORAL
Qty: 60 TABLET | OUTPATIENT
Start: 2023-01-03

## 2023-01-03 RX ORDER — SIMVASTATIN 40 MG
TABLET ORAL
Qty: 90 TABLET | Refills: 3 | Status: SHIPPED | OUTPATIENT
Start: 2023-01-03

## 2023-01-04 ENCOUNTER — HOSPITAL ENCOUNTER (OUTPATIENT)
Dept: MAMMOGRAPHY | Age: 63
Discharge: HOME | End: 2023-01-07
Payer: MEDICARE

## 2023-01-04 DIAGNOSIS — Z12.31 SCREENING MAMMOGRAM FOR BREAST CANCER: ICD-10-CM

## 2023-01-04 PROCEDURE — 77067 SCR MAMMO BI INCL CAD: CPT

## 2023-01-16 ENCOUNTER — TELEMEDICINE (OUTPATIENT)
Dept: FAMILY MEDICINE CLINIC | Facility: CLINIC | Age: 63
End: 2023-01-16
Payer: MEDICARE

## 2023-01-16 DIAGNOSIS — I10 PRIMARY HYPERTENSION: ICD-10-CM

## 2023-01-16 DIAGNOSIS — F32.0 MAJOR DEPRESSIVE DISORDER, SINGLE EPISODE, MILD (HCC): ICD-10-CM

## 2023-01-16 DIAGNOSIS — E78.00 PURE HYPERCHOLESTEROLEMIA: ICD-10-CM

## 2023-01-16 DIAGNOSIS — E11.9 TYPE 2 DIABETES MELLITUS WITHOUT COMPLICATION, WITHOUT LONG-TERM CURRENT USE OF INSULIN (HCC): ICD-10-CM

## 2023-01-16 DIAGNOSIS — G47.00 INSOMNIA, UNSPECIFIED TYPE: Primary | ICD-10-CM

## 2023-01-16 PROCEDURE — 99443 PR PHYS/QHP TELEPHONE EVALUATION 21-30 MIN: CPT | Performed by: FAMILY MEDICINE

## 2023-01-16 RX ORDER — ESCITALOPRAM OXALATE 10 MG/1
10 TABLET ORAL DAILY
Qty: 30 TABLET | Refills: 3 | Status: SHIPPED | OUTPATIENT
Start: 2023-01-16

## 2023-01-16 ASSESSMENT — PATIENT HEALTH QUESTIONNAIRE - PHQ9
SUM OF ALL RESPONSES TO PHQ QUESTIONS 1-9: 2
2. FEELING DOWN, DEPRESSED OR HOPELESS: 1
SUM OF ALL RESPONSES TO PHQ9 QUESTIONS 1 & 2: 2
SUM OF ALL RESPONSES TO PHQ QUESTIONS 1-9: 2
1. LITTLE INTEREST OR PLEASURE IN DOING THINGS: 1

## 2023-01-16 NOTE — PROGRESS NOTES
SUBJECTIVE:   Panchito Funez is a 57 y.o. female who has a past medical history significant for hypertension, diabetes and high cholesterol. Patient presents today for virtual visit via telephone encounter. She reports that she recently allowed her nephew to move in with her. This is creating a significant amount of stress and anxiety. She is having crying spells and insomnia. She has taken some melatonin which is helped some. However her anxiety and depression seem to be escalating. She would like something to treat these issues if possible. Patient's vital signs were not performed as encounter was performed virtually. Location of virtual visit was patient's home. HPI  See above    Past Medical History, Past Surgical History, Family history, Social History, and Medications were all reviewed with the patient today and updated as necessary. Current Outpatient Medications   Medication Sig Dispense Refill    simvastatin (ZOCOR) 40 MG tablet TAKE 1 TABLET BY MOUTH NIGHTLY. INDICATIONS: HIGH AMOUNT OF TRIGLYCERIDE IN THE BLOOD 90 tablet 3    SUCRAID 8500 UNIT/ML SOLN       metFORMIN (GLUCOPHAGE-XR) 500 MG extended release tablet TAKE 2 TABLETS BY MOUTH DAILY (WITH DINNER). 180 tablet 3    metoprolol tartrate (LOPRESSOR) 25 MG tablet Take 1 tablet by mouth daily 90 tablet 3    triamterene-hydroCHLOROthiazide (DYAZIDE) 37.5-25 MG per capsule Take 1 capsule by mouth daily 90 capsule 3    calcium carbonate 1500 (600 Ca) MG TABS tablet Take 1 tablet by mouth 2 times daily 180 tablet 3    ZINC PO Take by mouth       No current facility-administered medications for this visit.      Allergies   Allergen Reactions    Sitagliptin Other (See Comments)     Joint pain, abdominal pain, low back pain     Patient Active Problem List   Diagnosis    Diabetes mellitus without complication (HCC)    Chronic idiopathic constipation    Encounter for long-term (current) use of medications    Osteopenia of spine Dysphagia    Hiatal hernia    Primary insomnia    Cervical radiculopathy    Weight gain    Right arm weakness    Internal hemorrhoids    Essential hypertension    Obesity (BMI 30-39. 9)    Gastroesophageal reflux disease without esophagitis    Dyskinesia of esophagus    Vitamin D deficiency    Fam hx-ischem heart disease    Mixed hyperlipidemia    Chronic pain of both knees    Chronic anal fissure     Past Medical History:   Diagnosis Date    Allergic rhinitis     Anemia     Arthralgia     Arthritis     Bunion     Chronic pain of both knees 1/23/2017    Closed nondisplaced fracture of middle phalanx of lesser toe of right foot 5/14/2018    Closed nondisplaced fracture of middle phalanx of lesser toe of right foot 5/14/2018 5/5/18    Cough     Depression     Diabetes ([facility] [address]     Diabetes mellitus type 2, diet-controlled ([facility] [address] 5/14/2018    Disorder of bone and cartilage     Dysphagia     Esophageal reflux     Essential hypertension 1/23/2017    Gastroesophageal reflux disease without esophagitis 1/23/2017    Hx of headache     Hx of lymphadenopathy     Hyperlipemia     Hyperlipidemia     Hypertension     Internal hemorrhoids 4/3/2020    Seen on colonoscopy on 4/2/2020    Irritable bowel syndrome     Medial epicondylitis, right elbow     Menopause     Migraine     Mixed hyperlipidemia 1/23/2017    Nasal congestion     Neck pain     Osteopenia of spine 12/12/2017    Palpitations     Right arm pain     Sinusitis     URI (upper respiratory infection)      Past Surgical History:   Procedure Laterality Date    BREAST REDUCTION SURGERY Bilateral     CHOLECYSTECTOMY      COLONOSCOPY  10/2016    HYSTERECTOMY (CERVIX STATUS UNKNOWN)      ORTHOPEDIC SURGERY      neck surgery    OTHER SURGICAL HISTORY      lung biopsy     Family History   Problem Relation Age of Onset    Cataracts Brother     Diabetes Brother     Breast Cancer Sister 47    Diabetes Sister     Heart Attack Father     Hypertension Father     Diabetes Mother had legs amputated at age 79    Hypertension Mother     Rheum Arthritis Mother      Social History     Tobacco Use    Smoking status: Never    Smokeless tobacco: Never   Substance Use Topics    Alcohol use: Yes         Review of Systems  See above    OBJECTIVE:  There were no vitals taken for this visit. Physical Exam    Medical problems and test results were reviewed with the patient today. ASSESSMENT and PLAN    1. Insomnia. Continue melatonin. This seems to be helping some. 2.  Depression. Counseling provided. According to the patient the nephew is not abusive but is \"getting into trouble\". Encouraged her to discuss these issues with her nephew and work toward resolution. Start Lexapro 10 mg a day. Recheck in 3 to 4 weeks. Sooner if needed. 3.  Diabetes. Most recent A1c was 6.7. Encourage yearly retinal exams and daily feet exams. 4.  High cholesterol. Continue current therapy. Rechecking at scheduled follow-up. 5.  Hypertension. Continue current therapy. Recheck BP with next visit. Consent: This patient and/or their healthcare decision maker is aware that this patient-initiated Telehealth encounter is a billable service, with coverage as determined by their insurance carrier. Patient is aware that they may receive a bill and has provided verbal consent to proceed: Yes    I was in the officewhile conducting this encounter. Patient was at home. This virtual visit was conducted telephone encounter only. -  I affirm this is a Patient Initiated Episode with an Established Patient who has not had a related appointment within my department in the past 7 days or scheduled within the next 24 hours. Note: this encounter is not billable if this call serves to triage the patient into an appointment for the relevant concern.       On this date 1/16/2023 I have spent 21 minutes reviewing previous notes, test results and face to face with the patient discussing the diagnosis and importance of compliance with the treatment plan as well as documenting on the day of the visit. Greater than 50% of this visit was spent counseling the patient about test results, prognosis, importance of compliance, education about disease process, benefits of medications, instructions for management of acute symptoms, and follow up plans.

## 2023-01-24 ENCOUNTER — OFFICE VISIT (OUTPATIENT)
Dept: FAMILY MEDICINE CLINIC | Facility: CLINIC | Age: 63
End: 2023-01-24
Payer: MEDICARE

## 2023-01-24 VITALS
HEART RATE: 94 BPM | WEIGHT: 179.6 LBS | HEIGHT: 64 IN | SYSTOLIC BLOOD PRESSURE: 128 MMHG | DIASTOLIC BLOOD PRESSURE: 76 MMHG | OXYGEN SATURATION: 98 % | BODY MASS INDEX: 30.66 KG/M2

## 2023-01-24 DIAGNOSIS — G47.00 INSOMNIA, UNSPECIFIED TYPE: ICD-10-CM

## 2023-01-24 DIAGNOSIS — E11.9 TYPE 2 DIABETES MELLITUS WITHOUT COMPLICATION, WITHOUT LONG-TERM CURRENT USE OF INSULIN (HCC): Primary | ICD-10-CM

## 2023-01-24 DIAGNOSIS — E78.00 PURE HYPERCHOLESTEROLEMIA: ICD-10-CM

## 2023-01-24 DIAGNOSIS — F43.21 SITUATIONAL DEPRESSION: ICD-10-CM

## 2023-01-24 DIAGNOSIS — I10 PRIMARY HYPERTENSION: ICD-10-CM

## 2023-01-24 PROCEDURE — G8484 FLU IMMUNIZE NO ADMIN: HCPCS | Performed by: FAMILY MEDICINE

## 2023-01-24 PROCEDURE — 3078F DIAST BP <80 MM HG: CPT | Performed by: FAMILY MEDICINE

## 2023-01-24 PROCEDURE — 2022F DILAT RTA XM EVC RTNOPTHY: CPT | Performed by: FAMILY MEDICINE

## 2023-01-24 PROCEDURE — 3074F SYST BP LT 130 MM HG: CPT | Performed by: FAMILY MEDICINE

## 2023-01-24 PROCEDURE — G8417 CALC BMI ABV UP PARAM F/U: HCPCS | Performed by: FAMILY MEDICINE

## 2023-01-24 PROCEDURE — G8428 CUR MEDS NOT DOCUMENT: HCPCS | Performed by: FAMILY MEDICINE

## 2023-01-24 PROCEDURE — 1036F TOBACCO NON-USER: CPT | Performed by: FAMILY MEDICINE

## 2023-01-24 PROCEDURE — 3017F COLORECTAL CA SCREEN DOC REV: CPT | Performed by: FAMILY MEDICINE

## 2023-01-24 PROCEDURE — 99213 OFFICE O/P EST LOW 20 MIN: CPT | Performed by: FAMILY MEDICINE

## 2023-01-24 PROCEDURE — 3046F HEMOGLOBIN A1C LEVEL >9.0%: CPT | Performed by: FAMILY MEDICINE

## 2023-01-24 ASSESSMENT — PATIENT HEALTH QUESTIONNAIRE - PHQ9: DEPRESSION UNABLE TO ASSESS: FUNCTIONAL CAPACITY MOTIVATION LIMITS ACCURACY

## 2023-01-24 NOTE — PROGRESS NOTES
SUBJECTIVE:   Kishan Cole is a 57 y.o. female who has a past medical history significant for hypertension, diabetes and high cholesterol. This visit the patient had reported some situational depression and anxiety with associated insomnia. Please refer to prior notes for details. She was placed on Lexapro 10 mg a day. She has been on it now for about 10 days. She was instructed to take melatonin with L-theanine for her insomnia. Patient reports that her anxiety and depression are already showing some improvement with the Lexapro. She is initiating sleep better with the melatonin but having difficulty maintaining sleep. Patient reports no active chest pain, shortness of breath, orthopnea or PND. GI and  review of systems is unremarkable. HPI  See above    Past Medical History, Past Surgical History, Family history, Social History, and Medications were all reviewed with the patient today and updated as necessary. Current Outpatient Medications   Medication Sig Dispense Refill    escitalopram (LEXAPRO) 10 MG tablet Take 1 tablet by mouth daily 30 tablet 3    simvastatin (ZOCOR) 40 MG tablet TAKE 1 TABLET BY MOUTH NIGHTLY. INDICATIONS: HIGH AMOUNT OF TRIGLYCERIDE IN THE BLOOD 90 tablet 3    SUCRAID 8500 UNIT/ML SOLN       metFORMIN (GLUCOPHAGE-XR) 500 MG extended release tablet TAKE 2 TABLETS BY MOUTH DAILY (WITH DINNER). 180 tablet 3    metoprolol tartrate (LOPRESSOR) 25 MG tablet Take 1 tablet by mouth daily 90 tablet 3    triamterene-hydroCHLOROthiazide (DYAZIDE) 37.5-25 MG per capsule Take 1 capsule by mouth daily 90 capsule 3    calcium carbonate 1500 (600 Ca) MG TABS tablet Take 1 tablet by mouth 2 times daily 180 tablet 3    ZINC PO Take by mouth       No current facility-administered medications for this visit.      Allergies   Allergen Reactions    Sitagliptin Other (See Comments)     Joint pain, abdominal pain, low back pain     Patient Active Problem List   Diagnosis    Diabetes mellitus without complication (HCC)    Chronic idiopathic constipation    Encounter for long-term (current) use of medications    Osteopenia of spine    Dysphagia    Hiatal hernia    Primary insomnia    Cervical radiculopathy    Weight gain    Right arm weakness    Internal hemorrhoids    Essential hypertension    Obesity (BMI 30-39. 9)    Gastroesophageal reflux disease without esophagitis    Dyskinesia of esophagus    Vitamin D deficiency    Fam hx-ischem heart disease    Mixed hyperlipidemia    Chronic pain of both knees    Chronic anal fissure    Major depressive disorder, single episode, mild ([address]     Past Medical History:   Diagnosis Date    Allergic rhinitis     Anemia     Arthralgia     Arthritis     Bunion     Chronic pain of both knees 1/23/2017    Closed nondisplaced fracture of middle phalanx of lesser toe of right foot 5/14/2018    Closed nondisplaced fracture of middle phalanx of lesser toe of right foot 5/14/2018 5/5/18    Cough     Depression     Diabetes ([address]     Diabetes mellitus type 2, diet-controlled ([address] 5/14/2018    Disorder of bone and cartilage     Dysphagia     Esophageal reflux     Essential hypertension 1/23/2017    Gastroesophageal reflux disease without esophagitis 1/23/2017    Hx of headache     Hx of lymphadenopathy     Hyperlipemia     Hyperlipidemia     Hypertension     Internal hemorrhoids 4/3/2020    Seen on colonoscopy on 4/2/2020    Irritable bowel syndrome     Medial epicondylitis, right elbow     Menopause     Migraine     Mixed hyperlipidemia 1/23/2017    Nasal congestion     Neck pain     Osteopenia of spine 12/12/2017    Palpitations     Right arm pain     Sinusitis     URI (upper respiratory infection)      Past Surgical History:   Procedure Laterality Date    BREAST REDUCTION SURGERY Bilateral     CHOLECYSTECTOMY      COLONOSCOPY  10/2016    HYSTERECTOMY (CERVIX STATUS UNKNOWN)      ORTHOPEDIC SURGERY      neck surgery    OTHER SURGICAL HISTORY      lung biopsy Family History   Problem Relation Age of Onset    Cataracts Brother     Diabetes Brother     Breast Cancer Sister 47    Diabetes Sister     Heart Attack Father     Hypertension Father     Diabetes Mother         had legs amputated at age 79    Hypertension Mother     Rheum Arthritis Mother      Social History     Tobacco Use    Smoking status: Never    Smokeless tobacco: Never   Substance Use Topics    Alcohol use: Yes         Review of Systems  See above    OBJECTIVE:  /76   Pulse 94   Ht 5' 4\" (1.626 m)   Wt 179 lb 9.6 oz (81.5 kg)   SpO2 98%   BMI 30.83 kg/m²      Physical Exam  Constitutional:       General: She is not in acute distress. Appearance: Normal appearance. She is not ill-appearing. HENT:      Head: Normocephalic and atraumatic. Cardiovascular:      Rate and Rhythm: Normal rate and regular rhythm. Heart sounds: Normal heart sounds. No murmur heard. Pulmonary:      Effort: Pulmonary effort is normal.      Breath sounds: Normal breath sounds. No wheezing or rhonchi. Musculoskeletal:         General: Normal range of motion. Cervical back: Normal range of motion and neck supple. Right lower leg: No edema. Left lower leg: No edema. Skin:     General: Skin is warm. Findings: No rash. Neurological:      Mental Status: She is alert and oriented to person, place, and time. Psychiatric:         Mood and Affect: Mood normal.         Behavior: Behavior normal.         Thought Content: Thought content normal.         Judgment: Judgment normal.       Medical problems and test results were reviewed with the patient today. ASSESSMENT and PLAN    1. Diabetes. A1c is 6.7. Microalbumin is negative. Encourage yearly retinal exams and daily feet exams. 2.  Hypertension. /76. Continue Lopressor and Maxide. Electrolytes and renal function are normal.    3.  High cholesterol. LDL 54. Continue Zocor.   Liver enzymes remain normal.    4. Insomnia. Hopefully as her depression and anxiety continue to improve this will also improve. In the interim continue melatonin with L-theanine. May add a Tylenol or Advil PM for couple weeks to see if this will help maintain your sleep. If not we will consider switching to a hypnotic. 5.  Situational depression. Improving with Lexapro. Continue. Reevaluate at scheduled follow-up. Elements of this note have been dictated using speech recognition software. As a result, errors of speech recognition may have occurred.

## 2023-02-07 RX ORDER — ESCITALOPRAM OXALATE 10 MG/1
10 TABLET ORAL DAILY
Qty: 90 TABLET | Refills: 1 | Status: SHIPPED | OUTPATIENT
Start: 2023-02-07

## 2023-04-03 PROBLEM — E11.9 DIABETES MELLITUS TYPE 2, DIET-CONTROLLED (HCC): Status: RESOLVED | Noted: 2018-05-14 | Resolved: 2022-03-14

## 2023-06-06 RX ORDER — ESCITALOPRAM OXALATE 10 MG/1
10 TABLET ORAL DAILY
Qty: 90 TABLET | Refills: 1 | Status: SHIPPED | OUTPATIENT
Start: 2023-06-06

## 2023-08-28 ENCOUNTER — NURSE ONLY (OUTPATIENT)
Dept: FAMILY MEDICINE CLINIC | Facility: CLINIC | Age: 63
End: 2023-08-28

## 2023-08-28 DIAGNOSIS — E78.00 PURE HYPERCHOLESTEROLEMIA: ICD-10-CM

## 2023-08-28 DIAGNOSIS — E11.9 TYPE 2 DIABETES MELLITUS WITHOUT COMPLICATION, WITHOUT LONG-TERM CURRENT USE OF INSULIN (HCC): ICD-10-CM

## 2023-08-28 DIAGNOSIS — I10 PRIMARY HYPERTENSION: ICD-10-CM

## 2023-08-28 RX ORDER — ESCITALOPRAM OXALATE 10 MG/1
TABLET ORAL
Qty: 90 TABLET | Refills: 1 | Status: SHIPPED | OUTPATIENT
Start: 2023-08-28

## 2023-08-29 LAB
ALBUMIN SERPL-MCNC: 3.3 G/DL (ref 3.2–4.6)
ALBUMIN/GLOB SERPL: 0.9 (ref 0.4–1.6)
ALP SERPL-CCNC: 100 U/L (ref 50–136)
ALT SERPL-CCNC: 13 U/L (ref 12–65)
ANION GAP SERPL CALC-SCNC: 5 MMOL/L (ref 2–11)
AST SERPL-CCNC: 10 U/L (ref 15–37)
BILIRUB SERPL-MCNC: 0.5 MG/DL (ref 0.2–1.1)
BUN SERPL-MCNC: 7 MG/DL (ref 8–23)
CALCIUM SERPL-MCNC: 8.9 MG/DL (ref 8.3–10.4)
CHLORIDE SERPL-SCNC: 108 MMOL/L (ref 101–110)
CHOLEST SERPL-MCNC: 130 MG/DL
CO2 SERPL-SCNC: 31 MMOL/L (ref 21–32)
CREAT SERPL-MCNC: 0.5 MG/DL (ref 0.6–1)
EST. AVERAGE GLUCOSE BLD GHB EST-MCNC: 157 MG/DL
GLOBULIN SER CALC-MCNC: 3.7 G/DL (ref 2.8–4.5)
GLUCOSE SERPL-MCNC: 129 MG/DL (ref 65–100)
HBA1C MFR BLD: 7.1 % (ref 4.8–5.6)
HDLC SERPL-MCNC: 52 MG/DL (ref 40–60)
HDLC SERPL: 2.5
LDLC SERPL CALC-MCNC: 63.4 MG/DL
POTASSIUM SERPL-SCNC: 3.6 MMOL/L (ref 3.5–5.1)
PROT SERPL-MCNC: 7 G/DL (ref 6.3–8.2)
SODIUM SERPL-SCNC: 144 MMOL/L (ref 133–143)
TRIGL SERPL-MCNC: 73 MG/DL (ref 35–150)
VLDLC SERPL CALC-MCNC: 14.6 MG/DL (ref 6–23)

## 2023-09-11 ENCOUNTER — OFFICE VISIT (OUTPATIENT)
Dept: FAMILY MEDICINE CLINIC | Facility: CLINIC | Age: 63
End: 2023-09-11
Payer: MEDICARE

## 2023-09-11 VITALS
DIASTOLIC BLOOD PRESSURE: 88 MMHG | HEIGHT: 64 IN | SYSTOLIC BLOOD PRESSURE: 130 MMHG | WEIGHT: 195.8 LBS | BODY MASS INDEX: 33.43 KG/M2

## 2023-09-11 DIAGNOSIS — I10 PRIMARY HYPERTENSION: Primary | ICD-10-CM

## 2023-09-11 DIAGNOSIS — E78.00 PURE HYPERCHOLESTEROLEMIA: ICD-10-CM

## 2023-09-11 DIAGNOSIS — F33.9 RECURRENT DEPRESSION (HCC): ICD-10-CM

## 2023-09-11 DIAGNOSIS — E11.9 TYPE 2 DIABETES MELLITUS WITHOUT COMPLICATION, WITHOUT LONG-TERM CURRENT USE OF INSULIN (HCC): ICD-10-CM

## 2023-09-11 PROCEDURE — 99213 OFFICE O/P EST LOW 20 MIN: CPT | Performed by: FAMILY MEDICINE

## 2023-09-11 PROCEDURE — 3079F DIAST BP 80-89 MM HG: CPT | Performed by: FAMILY MEDICINE

## 2023-09-11 PROCEDURE — G8417 CALC BMI ABV UP PARAM F/U: HCPCS | Performed by: FAMILY MEDICINE

## 2023-09-11 PROCEDURE — 2022F DILAT RTA XM EVC RTNOPTHY: CPT | Performed by: FAMILY MEDICINE

## 2023-09-11 PROCEDURE — 3051F HG A1C>EQUAL 7.0%<8.0%: CPT | Performed by: FAMILY MEDICINE

## 2023-09-11 PROCEDURE — 1036F TOBACCO NON-USER: CPT | Performed by: FAMILY MEDICINE

## 2023-09-11 PROCEDURE — 1123F ACP DISCUSS/DSCN MKR DOCD: CPT | Performed by: FAMILY MEDICINE

## 2023-09-11 PROCEDURE — 3075F SYST BP GE 130 - 139MM HG: CPT | Performed by: FAMILY MEDICINE

## 2023-09-11 PROCEDURE — G8427 DOCREV CUR MEDS BY ELIG CLIN: HCPCS | Performed by: FAMILY MEDICINE

## 2023-09-11 PROCEDURE — 3017F COLORECTAL CA SCREEN DOC REV: CPT | Performed by: FAMILY MEDICINE

## 2023-09-11 PROCEDURE — G8399 PT W/DXA RESULTS DOCUMENT: HCPCS | Performed by: FAMILY MEDICINE

## 2023-09-11 PROCEDURE — 1090F PRES/ABSN URINE INCON ASSESS: CPT | Performed by: FAMILY MEDICINE

## 2023-09-11 ASSESSMENT — PATIENT HEALTH QUESTIONNAIRE - PHQ9
SUM OF ALL RESPONSES TO PHQ QUESTIONS 1-9: 1
2. FEELING DOWN, DEPRESSED OR HOPELESS: 1
SUM OF ALL RESPONSES TO PHQ QUESTIONS 1-9: 1
1. LITTLE INTEREST OR PLEASURE IN DOING THINGS: 0
SUM OF ALL RESPONSES TO PHQ QUESTIONS 1-9: 1
SUM OF ALL RESPONSES TO PHQ QUESTIONS 1-9: 1
SUM OF ALL RESPONSES TO PHQ9 QUESTIONS 1 & 2: 1

## 2023-09-11 NOTE — PROGRESS NOTES
Chronic pain of both knees    Chronic anal fissure    Major depressive disorder, single episode, mild (HCC)     Past Medical History:   Diagnosis Date    Allergic rhinitis     Anemia     Arthralgia     Arthritis     Bunion     Chronic pain of both knees 1/23/2017    Closed nondisplaced fracture of middle phalanx of lesser toe of right foot 5/14/2018    Closed nondisplaced fracture of middle phalanx of lesser toe of right foot 5/14/2018 5/5/18    Cough     Depression     Diabetes ([address])     Diabetes mellitus type 2, diet-controlled ([address]) 5/14/2018    Disorder of bone and cartilage     Dysphagia     Esophageal reflux     Essential hypertension 1/23/2017    Gastroesophageal reflux disease without esophagitis 1/23/2017    Hx of headache     Hx of lymphadenopathy     Hyperlipemia     Hyperlipidemia     Hypertension     Internal hemorrhoids 4/3/2020    Seen on colonoscopy on 4/2/2020    Irritable bowel syndrome     Medial epicondylitis, right elbow     Menopause     Migraine     Mixed hyperlipidemia 1/23/2017    Nasal congestion     Neck pain     Osteopenia of spine 12/12/2017    Palpitations     Right arm pain     Sinusitis     URI (upper respiratory infection)      Past Surgical History:   Procedure Laterality Date    BREAST REDUCTION SURGERY Bilateral     CHOLECYSTECTOMY      COLONOSCOPY  10/2016    HYSTERECTOMY (CERVIX STATUS UNKNOWN)      ORTHOPEDIC SURGERY      neck surgery    OTHER SURGICAL HISTORY      lung biopsy     Family History   Problem Relation Age of Onset    Cataracts Brother     Diabetes Brother     Breast Cancer Sister 47    Diabetes Sister     Heart Attack Father     Hypertension Father     Diabetes Mother         had legs amputated at age 79    Hypertension Mother     Rheum Arthritis Mother      Social History     Tobacco Use    Smoking status: Never    Smokeless tobacco: Never   Substance Use Topics    Alcohol use: Yes         Review of Systems  See above    OBJECTIVE:  /88   Ht 5' 4\" (1.626

## 2023-10-15 DIAGNOSIS — E11.9 TYPE 2 DIABETES MELLITUS WITHOUT COMPLICATION, WITHOUT LONG-TERM CURRENT USE OF INSULIN (HCC): ICD-10-CM

## 2023-10-16 DIAGNOSIS — E11.9 TYPE 2 DIABETES MELLITUS WITHOUT COMPLICATION, WITHOUT LONG-TERM CURRENT USE OF INSULIN (HCC): ICD-10-CM

## 2023-10-16 RX ORDER — METFORMIN HYDROCHLORIDE 500 MG/1
TABLET, EXTENDED RELEASE ORAL
Qty: 180 TABLET | Refills: 3 | Status: SHIPPED | OUTPATIENT
Start: 2023-10-16

## 2023-10-16 RX ORDER — METFORMIN HYDROCHLORIDE 500 MG/1
TABLET, EXTENDED RELEASE ORAL
Qty: 180 TABLET | Refills: 10 | OUTPATIENT
Start: 2023-10-16

## 2023-10-21 DIAGNOSIS — E78.2 MIXED HYPERLIPIDEMIA: ICD-10-CM

## 2023-10-23 DIAGNOSIS — E78.2 MIXED HYPERLIPIDEMIA: ICD-10-CM

## 2023-10-23 RX ORDER — SIMVASTATIN 40 MG
TABLET ORAL
Qty: 90 TABLET | Refills: 3 | Status: SHIPPED | OUTPATIENT
Start: 2023-10-23

## 2023-10-23 RX ORDER — SIMVASTATIN 40 MG
TABLET ORAL
Qty: 90 TABLET | Refills: 10 | OUTPATIENT
Start: 2023-10-23

## 2023-12-20 PROBLEM — Z86.2 HISTORY OF SARCOIDOSIS: Status: ACTIVE | Noted: 2023-12-20

## 2024-01-04 RX ORDER — ESCITALOPRAM OXALATE 10 MG/1
10 TABLET ORAL DAILY
Qty: 90 TABLET | Refills: 3 | Status: SHIPPED | OUTPATIENT
Start: 2024-01-04

## 2024-01-31 ENCOUNTER — TELEMEDICINE (OUTPATIENT)
Dept: FAMILY MEDICINE CLINIC | Facility: CLINIC | Age: 64
End: 2024-01-31
Payer: MEDICARE

## 2024-01-31 DIAGNOSIS — F33.9 RECURRENT DEPRESSION (HCC): ICD-10-CM

## 2024-01-31 DIAGNOSIS — Z86.2 HISTORY OF SARCOIDOSIS: ICD-10-CM

## 2024-01-31 DIAGNOSIS — I10 PRIMARY HYPERTENSION: ICD-10-CM

## 2024-01-31 DIAGNOSIS — E78.00 PURE HYPERCHOLESTEROLEMIA: ICD-10-CM

## 2024-01-31 DIAGNOSIS — E11.9 TYPE 2 DIABETES MELLITUS WITHOUT COMPLICATION, WITHOUT LONG-TERM CURRENT USE OF INSULIN (HCC): Primary | ICD-10-CM

## 2024-01-31 PROCEDURE — 99442 PR PHYS/QHP TELEPHONE EVALUATION 11-20 MIN: CPT | Performed by: FAMILY MEDICINE

## 2024-01-31 SDOH — ECONOMIC STABILITY: FOOD INSECURITY: WITHIN THE PAST 12 MONTHS, YOU WORRIED THAT YOUR FOOD WOULD RUN OUT BEFORE YOU GOT MONEY TO BUY MORE.: NEVER TRUE

## 2024-01-31 SDOH — ECONOMIC STABILITY: HOUSING INSECURITY
IN THE LAST 12 MONTHS, WAS THERE A TIME WHEN YOU DID NOT HAVE A STEADY PLACE TO SLEEP OR SLEPT IN A SHELTER (INCLUDING NOW)?: NO

## 2024-01-31 SDOH — ECONOMIC STABILITY: FOOD INSECURITY: WITHIN THE PAST 12 MONTHS, THE FOOD YOU BOUGHT JUST DIDN'T LAST AND YOU DIDN'T HAVE MONEY TO GET MORE.: NEVER TRUE

## 2024-01-31 SDOH — ECONOMIC STABILITY: INCOME INSECURITY: HOW HARD IS IT FOR YOU TO PAY FOR THE VERY BASICS LIKE FOOD, HOUSING, MEDICAL CARE, AND HEATING?: NOT HARD AT ALL

## 2024-01-31 ASSESSMENT — PATIENT HEALTH QUESTIONNAIRE - PHQ9
2. FEELING DOWN, DEPRESSED OR HOPELESS: 0
1. LITTLE INTEREST OR PLEASURE IN DOING THINGS: 0
SUM OF ALL RESPONSES TO PHQ9 QUESTIONS 1 & 2: 0
SUM OF ALL RESPONSES TO PHQ QUESTIONS 1-9: 0

## 2024-01-31 NOTE — PROGRESS NOTES
SUBJECTIVE:   Rachna Morillo is a 63 y.o. female ho has a past medical history significant for hypertension, diabetes, recurrent depression and high cholesterol.  Patient presents today for virtual visit via telephone encounter.  Patient is doing well with no complaint of chest pain, shortness of breath, orthopnea or PND.  GI and  review of systems is unremarkable.    Patient's vital signs were not performed as encounter was performed virtually.  Location of virtual visit was patient's home.      HPI  See above    Past Medical History, Past Surgical History, Family history, Social History, and Medications were all reviewed with the patient today and updated as necessary.       Current Outpatient Medications   Medication Sig Dispense Refill    escitalopram (LEXAPRO) 10 MG tablet TAKE 1 TABLET EVERY DAY 90 tablet 3    metoprolol tartrate (LOPRESSOR) 25 MG tablet Take 1 tablet by mouth daily 90 tablet 3    simvastatin (ZOCOR) 40 MG tablet Take one tablet by mouth once daily 90 tablet 3    metFORMIN (GLUCOPHAGE-XR) 500 MG extended release tablet TAKE 2 TABLETS BY MOUTH DAILY (WITH DINNER). 180 tablet 3    calcium carbonate 1500 (600 Ca) MG TABS tablet TAKE 1 TABLET BY MOUTH TWICE A  tablet 3    SUCRAID 8500 UNIT/ML SOLN       ZINC PO Take by mouth       No current facility-administered medications for this visit.     Allergies   Allergen Reactions    Sitagliptin Other (See Comments)     Joint pain, abdominal pain, low back pain     Patient Active Problem List   Diagnosis    Diabetes mellitus without complication (HCC)    Chronic idiopathic constipation    Encounter for long-term (current) use of medications    Osteopenia of spine    Dysphagia    Hiatal hernia    Primary insomnia    Cervical radiculopathy    Weight gain    Right arm weakness    Internal hemorrhoids    Essential hypertension    Obesity (BMI 30-39.9)    Gastroesophageal reflux disease without esophagitis    Dyskinesia of esophagus    Vitamin D

## 2024-07-30 ENCOUNTER — LAB (OUTPATIENT)
Dept: FAMILY MEDICINE CLINIC | Facility: CLINIC | Age: 64
End: 2024-07-30

## 2024-07-30 DIAGNOSIS — E11.9 TYPE 2 DIABETES MELLITUS WITHOUT COMPLICATION, WITHOUT LONG-TERM CURRENT USE OF INSULIN (HCC): ICD-10-CM

## 2024-07-30 DIAGNOSIS — E78.00 PURE HYPERCHOLESTEROLEMIA: ICD-10-CM

## 2024-07-30 DIAGNOSIS — I10 PRIMARY HYPERTENSION: ICD-10-CM

## 2024-07-30 LAB
ALBUMIN SERPL-MCNC: 3.4 G/DL (ref 3.2–4.6)
ALBUMIN/GLOB SERPL: 0.9 (ref 1–1.9)
ALP SERPL-CCNC: 98 U/L (ref 35–104)
ALT SERPL-CCNC: 10 U/L (ref 12–65)
ANION GAP SERPL CALC-SCNC: 11 MMOL/L (ref 9–18)
AST SERPL-CCNC: 13 U/L (ref 15–37)
BILIRUB SERPL-MCNC: 0.4 MG/DL (ref 0–1.2)
BUN SERPL-MCNC: 11 MG/DL (ref 8–23)
CALCIUM SERPL-MCNC: 9.2 MG/DL (ref 8.8–10.2)
CHLORIDE SERPL-SCNC: 102 MMOL/L (ref 98–107)
CHOLEST SERPL-MCNC: 137 MG/DL (ref 0–200)
CO2 SERPL-SCNC: 28 MMOL/L (ref 20–28)
CREAT SERPL-MCNC: 0.59 MG/DL (ref 0.6–1.1)
EST. AVERAGE GLUCOSE BLD GHB EST-MCNC: 165 MG/DL
GLOBULIN SER CALC-MCNC: 3.9 G/DL (ref 2.3–3.5)
GLUCOSE SERPL-MCNC: 142 MG/DL (ref 70–99)
HBA1C MFR BLD: 7.4 % (ref 0–5.6)
HDLC SERPL-MCNC: 48 MG/DL (ref 40–60)
HDLC SERPL: 2.8 (ref 0–5)
LDLC SERPL CALC-MCNC: 70 MG/DL (ref 0–100)
POTASSIUM SERPL-SCNC: 4.1 MMOL/L (ref 3.5–5.1)
PROT SERPL-MCNC: 7.3 G/DL (ref 6.3–8.2)
SODIUM SERPL-SCNC: 141 MMOL/L (ref 136–145)
TRIGL SERPL-MCNC: 93 MG/DL (ref 0–150)
VLDLC SERPL CALC-MCNC: 19 MG/DL (ref 6–23)

## 2024-08-06 ENCOUNTER — OFFICE VISIT (OUTPATIENT)
Dept: FAMILY MEDICINE CLINIC | Facility: CLINIC | Age: 64
End: 2024-08-06
Payer: MEDICARE

## 2024-08-06 VITALS
WEIGHT: 193.6 LBS | HEART RATE: 71 BPM | OXYGEN SATURATION: 98 % | DIASTOLIC BLOOD PRESSURE: 82 MMHG | BODY MASS INDEX: 33.05 KG/M2 | HEIGHT: 64 IN | SYSTOLIC BLOOD PRESSURE: 139 MMHG

## 2024-08-06 DIAGNOSIS — I10 PRIMARY HYPERTENSION: Primary | ICD-10-CM

## 2024-08-06 DIAGNOSIS — E78.2 MIXED HYPERLIPIDEMIA: ICD-10-CM

## 2024-08-06 DIAGNOSIS — F33.9 RECURRENT DEPRESSION (HCC): ICD-10-CM

## 2024-08-06 DIAGNOSIS — E11.9 TYPE 2 DIABETES MELLITUS WITHOUT COMPLICATION, WITHOUT LONG-TERM CURRENT USE OF INSULIN (HCC): ICD-10-CM

## 2024-08-06 PROCEDURE — 99213 OFFICE O/P EST LOW 20 MIN: CPT | Performed by: FAMILY MEDICINE

## 2024-08-06 PROCEDURE — G8427 DOCREV CUR MEDS BY ELIG CLIN: HCPCS | Performed by: FAMILY MEDICINE

## 2024-08-06 PROCEDURE — 1036F TOBACCO NON-USER: CPT | Performed by: FAMILY MEDICINE

## 2024-08-06 PROCEDURE — 1090F PRES/ABSN URINE INCON ASSESS: CPT | Performed by: FAMILY MEDICINE

## 2024-08-06 PROCEDURE — G8417 CALC BMI ABV UP PARAM F/U: HCPCS | Performed by: FAMILY MEDICINE

## 2024-08-06 PROCEDURE — 3075F SYST BP GE 130 - 139MM HG: CPT | Performed by: FAMILY MEDICINE

## 2024-08-06 PROCEDURE — 2022F DILAT RTA XM EVC RTNOPTHY: CPT | Performed by: FAMILY MEDICINE

## 2024-08-06 PROCEDURE — 3051F HG A1C>EQUAL 7.0%<8.0%: CPT | Performed by: FAMILY MEDICINE

## 2024-08-06 PROCEDURE — 3017F COLORECTAL CA SCREEN DOC REV: CPT | Performed by: FAMILY MEDICINE

## 2024-08-06 PROCEDURE — G8399 PT W/DXA RESULTS DOCUMENT: HCPCS | Performed by: FAMILY MEDICINE

## 2024-08-06 PROCEDURE — 1123F ACP DISCUSS/DSCN MKR DOCD: CPT | Performed by: FAMILY MEDICINE

## 2024-08-06 PROCEDURE — 3079F DIAST BP 80-89 MM HG: CPT | Performed by: FAMILY MEDICINE

## 2024-08-06 PROCEDURE — G2211 COMPLEX E/M VISIT ADD ON: HCPCS | Performed by: FAMILY MEDICINE

## 2024-08-06 RX ORDER — METFORMIN HYDROCHLORIDE 500 MG/1
TABLET, EXTENDED RELEASE ORAL
Qty: 180 TABLET | Refills: 3 | Status: SHIPPED | OUTPATIENT
Start: 2024-08-06

## 2024-08-06 RX ORDER — SIMVASTATIN 40 MG
TABLET ORAL
Qty: 90 TABLET | Refills: 3 | Status: SHIPPED | OUTPATIENT
Start: 2024-08-06

## 2024-08-06 ASSESSMENT — PATIENT HEALTH QUESTIONNAIRE - PHQ9
1. LITTLE INTEREST OR PLEASURE IN DOING THINGS: NOT AT ALL
SUM OF ALL RESPONSES TO PHQ QUESTIONS 1-9: 0
2. FEELING DOWN, DEPRESSED OR HOPELESS: NOT AT ALL
7. TROUBLE CONCENTRATING ON THINGS, SUCH AS READING THE NEWSPAPER OR WATCHING TELEVISION: NOT AT ALL
SUM OF ALL RESPONSES TO PHQ QUESTIONS 1-9: 0
9. THOUGHTS THAT YOU WOULD BE BETTER OFF DEAD, OR OF HURTING YOURSELF: NOT AT ALL
6. FEELING BAD ABOUT YOURSELF - OR THAT YOU ARE A FAILURE OR HAVE LET YOURSELF OR YOUR FAMILY DOWN: NOT AT ALL
SUM OF ALL RESPONSES TO PHQ QUESTIONS 1-9: 0
4. FEELING TIRED OR HAVING LITTLE ENERGY: NOT AT ALL
8. MOVING OR SPEAKING SO SLOWLY THAT OTHER PEOPLE COULD HAVE NOTICED. OR THE OPPOSITE, BEING SO FIGETY OR RESTLESS THAT YOU HAVE BEEN MOVING AROUND A LOT MORE THAN USUAL: NOT AT ALL
SUM OF ALL RESPONSES TO PHQ9 QUESTIONS 1 & 2: 0
5. POOR APPETITE OR OVEREATING: NOT AT ALL
SUM OF ALL RESPONSES TO PHQ QUESTIONS 1-9: 0
10. IF YOU CHECKED OFF ANY PROBLEMS, HOW DIFFICULT HAVE THESE PROBLEMS MADE IT FOR YOU TO DO YOUR WORK, TAKE CARE OF THINGS AT HOME, OR GET ALONG WITH OTHER PEOPLE: NOT DIFFICULT AT ALL
3. TROUBLE FALLING OR STAYING ASLEEP: NOT AT ALL

## 2024-08-06 NOTE — PROGRESS NOTES
SUBJECTIVE:   Rachna Morillo is a 64 y.o. female who has a past medical history significant for hypertension, diabetes, recurrent depression and high cholesterol. Review of systems reveals no complaints of chest pain, shortness of breath, orthopnea or PND.  GI and  review of systems is unremarkable.  Current medications are listed in the EMR and reviewed today.      HPI  See above    Past Medical History, Past Surgical History, Family history, Social History, and Medications were all reviewed with the patient today and updated as necessary.       Current Outpatient Medications   Medication Sig Dispense Refill    calcium carbonate 1500 (600 Ca) MG TABS tablet Take 1 tablet by mouth 2 times daily 180 tablet 3    metFORMIN (GLUCOPHAGE-XR) 500 MG extended release tablet TAKE 2 TABLETS BY MOUTH DAILY (WITH DINNER). 180 tablet 3    metoprolol tartrate (LOPRESSOR) 25 MG tablet Take 1 tablet by mouth daily 90 tablet 3    simvastatin (ZOCOR) 40 MG tablet Take one tablet by mouth once daily 90 tablet 3    escitalopram (LEXAPRO) 10 MG tablet TAKE 1 TABLET EVERY DAY 90 tablet 3    ZINC PO Take by mouth      SUCRAID 8500 UNIT/ML SOLN  (Patient not taking: Reported on 8/6/2024)       No current facility-administered medications for this visit.     Allergies   Allergen Reactions    Sitagliptin Other (See Comments)     Joint pain, abdominal pain, low back pain     Patient Active Problem List   Diagnosis    Diabetes mellitus without complication (HCC)    Chronic idiopathic constipation    Encounter for long-term (current) use of medications    Osteopenia of spine    Dysphagia    Hiatal hernia    Primary insomnia    Cervical radiculopathy    Weight gain    Right arm weakness    Internal hemorrhoids    Essential hypertension    Obesity (BMI 30-39.9)    Gastroesophageal reflux disease without esophagitis    Dyskinesia of esophagus    Vitamin D deficiency    Fam hx-ischem heart disease    Mixed hyperlipidemia    Chronic pain of both

## 2024-08-06 NOTE — PROGRESS NOTES
\"Have you been to the ER, urgent care clinic since your last visit?  Hospitalized since your last visit?\"    NO    “Have you seen or consulted any other health care providers outside of [facility] since your last visit?”    NO    Have you had a mammogram?”   NO    Date of last Mammogram: 1/4/2023             Click Here for Release of Records Request

## 2024-09-16 DIAGNOSIS — E11.9 TYPE 2 DIABETES MELLITUS WITHOUT COMPLICATION, WITHOUT LONG-TERM CURRENT USE OF INSULIN (HCC): ICD-10-CM

## 2024-09-16 DIAGNOSIS — E78.2 MIXED HYPERLIPIDEMIA: ICD-10-CM

## 2024-09-16 RX ORDER — METFORMIN HCL 500 MG
TABLET, EXTENDED RELEASE 24 HR ORAL
Qty: 180 TABLET | Refills: 3 | Status: SHIPPED | OUTPATIENT
Start: 2024-09-16

## 2024-09-16 RX ORDER — SIMVASTATIN 40 MG
TABLET ORAL
Qty: 90 TABLET | Refills: 3 | Status: SHIPPED | OUTPATIENT
Start: 2024-09-16

## 2024-09-16 RX ORDER — METOPROLOL TARTRATE 25 MG/1
25 TABLET, FILM COATED ORAL DAILY
Qty: 90 TABLET | Refills: 3 | Status: SHIPPED | OUTPATIENT
Start: 2024-09-16

## 2024-11-13 RX ORDER — ESCITALOPRAM OXALATE 10 MG/1
10 TABLET ORAL DAILY
Qty: 100 TABLET | Refills: 3 | Status: SHIPPED | OUTPATIENT
Start: 2024-11-13

## 2024-12-09 ENCOUNTER — OFFICE VISIT (OUTPATIENT)
Dept: FAMILY MEDICINE CLINIC | Facility: CLINIC | Age: 64
End: 2024-12-09

## 2024-12-09 VITALS
HEART RATE: 83 BPM | OXYGEN SATURATION: 100 % | DIASTOLIC BLOOD PRESSURE: 86 MMHG | BODY MASS INDEX: 34.16 KG/M2 | SYSTOLIC BLOOD PRESSURE: 138 MMHG | WEIGHT: 199 LBS

## 2024-12-09 DIAGNOSIS — E11.65 TYPE 2 DIABETES MELLITUS WITH HYPERGLYCEMIA, WITHOUT LONG-TERM CURRENT USE OF INSULIN (HCC): ICD-10-CM

## 2024-12-09 DIAGNOSIS — R05.1 ACUTE COUGH: Primary | ICD-10-CM

## 2024-12-09 DIAGNOSIS — B96.89 ACUTE BACTERIAL BRONCHITIS: ICD-10-CM

## 2024-12-09 DIAGNOSIS — J30.9 ALLERGIC RHINITIS, UNSPECIFIED SEASONALITY, UNSPECIFIED TRIGGER: ICD-10-CM

## 2024-12-09 DIAGNOSIS — F33.9 RECURRENT DEPRESSION (HCC): ICD-10-CM

## 2024-12-09 DIAGNOSIS — J20.8 ACUTE BACTERIAL BRONCHITIS: ICD-10-CM

## 2024-12-09 DIAGNOSIS — E78.00 PURE HYPERCHOLESTEROLEMIA: ICD-10-CM

## 2024-12-09 DIAGNOSIS — I10 PRIMARY HYPERTENSION: ICD-10-CM

## 2024-12-09 RX ORDER — TRIAMCINOLONE ACETONIDE 40 MG/ML
40 INJECTION, SUSPENSION INTRA-ARTICULAR; INTRAMUSCULAR ONCE
Status: COMPLETED | OUTPATIENT
Start: 2024-12-09 | End: 2024-12-09

## 2024-12-09 RX ORDER — BENZONATATE 200 MG/1
200 CAPSULE ORAL 3 TIMES DAILY PRN
Qty: 30 CAPSULE | Refills: 0 | Status: SHIPPED | OUTPATIENT
Start: 2024-12-09

## 2024-12-09 RX ORDER — AZITHROMYCIN 250 MG/1
TABLET, FILM COATED ORAL
Qty: 6 TABLET | Refills: 0 | Status: SHIPPED | OUTPATIENT
Start: 2024-12-09

## 2024-12-09 RX ADMIN — TRIAMCINOLONE ACETONIDE 40 MG: 40 INJECTION, SUSPENSION INTRA-ARTICULAR; INTRAMUSCULAR at 17:06

## 2024-12-09 NOTE — PROGRESS NOTES
\"Have you been to the ER, urgent care clinic since your last visit?  Hospitalized since your last visit?\"    NO    “Have you seen or consulted any other health care providers outside our system since your last visit?”    NO    Have you had a mammogram?”   NO    Date of last Mammogram: 1/4/2023       “Have you had a diabetic eye exam?”    YES - Where: [facility] [place] Nurse/CMA to request most recent records if not in the chart     Date of last diabetic eye exam: 11/1/2023           
disease without esophagitis    Dyskinesia of esophagus    Vitamin D deficiency    Fam hx-ischem heart disease    Mixed hyperlipidemia    Chronic pain of both knees    Chronic anal fissure    Major depressive disorder, single episode, mild ([facility])    History of sarcoidosis     Past Medical History:   Diagnosis Date    Allergic rhinitis     Anemia     Arthralgia     Arthritis     Bunion     Chronic pain of both knees 1/23/2017    Closed nondisplaced fracture of middle phalanx of lesser toe of right foot 5/14/2018    Closed nondisplaced fracture of middle phalanx of lesser toe of right foot 5/14/2018 5/5/18    Cough     Depression     Diabetes ([facility])     Diabetes mellitus type 2, diet-controlled ([facility]) 5/14/2018    Disorder of bone and cartilage     Dysphagia     Esophageal reflux     Essential hypertension 1/23/2017    Gastroesophageal reflux disease without esophagitis 1/23/2017    Hx of headache     Hx of lymphadenopathy     Hyperlipemia     Hyperlipidemia     Hypertension     Internal hemorrhoids 4/3/2020    Seen on colonoscopy on 4/2/2020    Irritable bowel syndrome     Medial epicondylitis, right elbow     Menopause     Migraine     Mixed hyperlipidemia 1/23/2017    Nasal congestion     Neck pain     Osteopenia of spine 12/12/2017    Palpitations     Right arm pain     Sinusitis     URI (upper respiratory infection)      Past Surgical History:   Procedure Laterality Date    BREAST REDUCTION SURGERY Bilateral     CHOLECYSTECTOMY      COLONOSCOPY  10/2016    HYSTERECTOMY (CERVIX STATUS UNKNOWN)      ORTHOPEDIC SURGERY      neck surgery    OTHER SURGICAL HISTORY      lung biopsy     Family History   Problem Relation Age of Onset    Cataracts Brother     Diabetes Brother     Breast Cancer Sister 54    Diabetes Sister     Heart Attack Father     Hypertension Father     Diabetes Mother         had legs amputated at age 70    Hypertension Mother     Rheum Arthritis Mother      Social History     Tobacco Use    Smoking

## 2024-12-30 ENCOUNTER — TRANSCRIBE ORDERS (OUTPATIENT)
Dept: SCHEDULING | Age: 64
End: 2024-12-30

## 2024-12-30 DIAGNOSIS — Z12.31 VISIT FOR SCREENING MAMMOGRAM: Primary | ICD-10-CM

## 2025-02-04 ENCOUNTER — OFFICE VISIT (OUTPATIENT)
Dept: FAMILY MEDICINE CLINIC | Facility: CLINIC | Age: 65
End: 2025-02-04

## 2025-02-04 VITALS
HEART RATE: 75 BPM | HEIGHT: 64 IN | DIASTOLIC BLOOD PRESSURE: 82 MMHG | OXYGEN SATURATION: 98 % | SYSTOLIC BLOOD PRESSURE: 120 MMHG | WEIGHT: 197 LBS | BODY MASS INDEX: 33.63 KG/M2

## 2025-02-04 DIAGNOSIS — Z00.00 MEDICARE ANNUAL WELLNESS VISIT, SUBSEQUENT: Primary | ICD-10-CM

## 2025-02-04 DIAGNOSIS — R09.82 POSTNASAL DRIP: ICD-10-CM

## 2025-02-04 DIAGNOSIS — I10 PRIMARY HYPERTENSION: ICD-10-CM

## 2025-02-04 DIAGNOSIS — J30.9 ALLERGIC RHINITIS, UNSPECIFIED SEASONALITY, UNSPECIFIED TRIGGER: ICD-10-CM

## 2025-02-04 DIAGNOSIS — F33.9 RECURRENT DEPRESSION (HCC): ICD-10-CM

## 2025-02-04 DIAGNOSIS — E11.9 TYPE 2 DIABETES MELLITUS WITHOUT COMPLICATION, WITHOUT LONG-TERM CURRENT USE OF INSULIN (HCC): ICD-10-CM

## 2025-02-04 DIAGNOSIS — E78.00 PURE HYPERCHOLESTEROLEMIA: ICD-10-CM

## 2025-02-04 LAB
ALBUMIN SERPL-MCNC: 3.4 G/DL (ref 3.2–4.6)
ALBUMIN/GLOB SERPL: 0.9 (ref 1–1.9)
ALP SERPL-CCNC: 84 U/L (ref 35–104)
ALT SERPL-CCNC: 13 U/L (ref 8–45)
ANION GAP SERPL CALC-SCNC: 13 MMOL/L (ref 7–16)
AST SERPL-CCNC: 19 U/L (ref 15–37)
BASOPHILS # BLD: 0.03 K/UL (ref 0–0.2)
BASOPHILS NFR BLD: 0.4 % (ref 0–2)
BILIRUB SERPL-MCNC: 0.3 MG/DL (ref 0–1.2)
BUN SERPL-MCNC: 13 MG/DL (ref 8–23)
CALCIUM SERPL-MCNC: 9.4 MG/DL (ref 8.8–10.2)
CHLORIDE SERPL-SCNC: 101 MMOL/L (ref 98–107)
CHOLEST SERPL-MCNC: 122 MG/DL (ref 0–200)
CO2 SERPL-SCNC: 28 MMOL/L (ref 20–29)
CREAT SERPL-MCNC: 0.63 MG/DL (ref 0.6–1.1)
CREAT UR-MCNC: 188 MG/DL (ref 28–217)
DIFFERENTIAL METHOD BLD: ABNORMAL
EOSINOPHIL # BLD: 0.12 K/UL (ref 0–0.8)
EOSINOPHIL NFR BLD: 1.7 % (ref 0.5–7.8)
ERYTHROCYTE [DISTWIDTH] IN BLOOD BY AUTOMATED COUNT: 14.9 % (ref 11.9–14.6)
EST. AVERAGE GLUCOSE BLD GHB EST-MCNC: 163 MG/DL
GLOBULIN SER CALC-MCNC: 3.9 G/DL (ref 2.3–3.5)
GLUCOSE SERPL-MCNC: 89 MG/DL (ref 70–99)
HBA1C MFR BLD: 7.3 % (ref 0–5.6)
HCT VFR BLD AUTO: 38.6 % (ref 35.8–46.3)
HDLC SERPL-MCNC: 50 MG/DL (ref 40–60)
HDLC SERPL: 2.5 (ref 0–5)
HGB BLD-MCNC: 11.6 G/DL (ref 11.7–15.4)
IMM GRANULOCYTES # BLD AUTO: 0.01 K/UL (ref 0–0.5)
IMM GRANULOCYTES NFR BLD AUTO: 0.1 % (ref 0–5)
LDLC SERPL CALC-MCNC: 59 MG/DL (ref 0–100)
LYMPHOCYTES # BLD: 1.41 K/UL (ref 0.5–4.6)
LYMPHOCYTES NFR BLD: 20.3 % (ref 13–44)
MCH RBC QN AUTO: 23.8 PG (ref 26.1–32.9)
MCHC RBC AUTO-ENTMCNC: 30.1 G/DL (ref 31.4–35)
MCV RBC AUTO: 79.1 FL (ref 82–102)
MICROALBUMIN UR-MCNC: 1.75 MG/DL (ref 0–20)
MICROALBUMIN/CREAT UR-RTO: 9 MG/G (ref 0–30)
MONOCYTES # BLD: 0.57 K/UL (ref 0.1–1.3)
MONOCYTES NFR BLD: 8.2 % (ref 4–12)
NEUTS SEG # BLD: 4.81 K/UL (ref 1.7–8.2)
NEUTS SEG NFR BLD: 69.3 % (ref 43–78)
NRBC # BLD: 0 K/UL (ref 0–0.2)
PLATELET # BLD AUTO: 303 K/UL (ref 150–450)
PMV BLD AUTO: 12 FL (ref 9.4–12.3)
POTASSIUM SERPL-SCNC: 3.6 MMOL/L (ref 3.5–5.1)
PROT SERPL-MCNC: 7.3 G/DL (ref 6.3–8.2)
RBC # BLD AUTO: 4.88 M/UL (ref 4.05–5.2)
SODIUM SERPL-SCNC: 142 MMOL/L (ref 136–145)
TRIGL SERPL-MCNC: 66 MG/DL (ref 0–150)
TSH, 3RD GENERATION: 1.79 UIU/ML (ref 0.27–4.2)
VLDLC SERPL CALC-MCNC: 13 MG/DL (ref 6–23)
WBC # BLD AUTO: 7 K/UL (ref 4.3–11.1)

## 2025-02-04 SDOH — ECONOMIC STABILITY: FOOD INSECURITY: WITHIN THE PAST 12 MONTHS, YOU WORRIED THAT YOUR FOOD WOULD RUN OUT BEFORE YOU GOT MONEY TO BUY MORE.: NEVER TRUE

## 2025-02-04 SDOH — ECONOMIC STABILITY: FOOD INSECURITY: WITHIN THE PAST 12 MONTHS, THE FOOD YOU BOUGHT JUST DIDN'T LAST AND YOU DIDN'T HAVE MONEY TO GET MORE.: NEVER TRUE

## 2025-02-04 ASSESSMENT — PATIENT HEALTH QUESTIONNAIRE - PHQ9
SUM OF ALL RESPONSES TO PHQ QUESTIONS 1-9: 6
4. FEELING TIRED OR HAVING LITTLE ENERGY: NEARLY EVERY DAY
7. TROUBLE CONCENTRATING ON THINGS, SUCH AS READING THE NEWSPAPER OR WATCHING TELEVISION: NOT AT ALL
SUM OF ALL RESPONSES TO PHQ QUESTIONS 1-9: 6
1. LITTLE INTEREST OR PLEASURE IN DOING THINGS: NOT AT ALL
9. THOUGHTS THAT YOU WOULD BE BETTER OFF DEAD, OR OF HURTING YOURSELF: NOT AT ALL
5. POOR APPETITE OR OVEREATING: SEVERAL DAYS
SUM OF ALL RESPONSES TO PHQ9 QUESTIONS 1 & 2: 0
SUM OF ALL RESPONSES TO PHQ QUESTIONS 1-9: 6
6. FEELING BAD ABOUT YOURSELF - OR THAT YOU ARE A FAILURE OR HAVE LET YOURSELF OR YOUR FAMILY DOWN: NOT AT ALL
SUM OF ALL RESPONSES TO PHQ QUESTIONS 1-9: 6
10. IF YOU CHECKED OFF ANY PROBLEMS, HOW DIFFICULT HAVE THESE PROBLEMS MADE IT FOR YOU TO DO YOUR WORK, TAKE CARE OF THINGS AT HOME, OR GET ALONG WITH OTHER PEOPLE: NOT DIFFICULT AT ALL
2. FEELING DOWN, DEPRESSED OR HOPELESS: NOT AT ALL
8. MOVING OR SPEAKING SO SLOWLY THAT OTHER PEOPLE COULD HAVE NOTICED. OR THE OPPOSITE, BEING SO FIGETY OR RESTLESS THAT YOU HAVE BEEN MOVING AROUND A LOT MORE THAN USUAL: NOT AT ALL
3. TROUBLE FALLING OR STAYING ASLEEP: MORE THAN HALF THE DAYS

## 2025-02-04 ASSESSMENT — LIFESTYLE VARIABLES
HOW OFTEN DO YOU HAVE A DRINK CONTAINING ALCOHOL: NEVER
HOW MANY STANDARD DRINKS CONTAINING ALCOHOL DO YOU HAVE ON A TYPICAL DAY: PATIENT DOES NOT DRINK

## 2025-02-04 NOTE — PROGRESS NOTES
SUBJECTIVE:   Rachna Morillo is a 64 y.o. female who has a past medical history significant for hypertension, diabetes, recurrent depression and high cholesterol.  Review of systems reveals some postnasal drip and allergy symptoms.  Drainage is clear.  Patient reports no fever or bodyaches.  Patient reports also that her anxiety and depression tend to fluctuate.  She is currently experiencing some mild worsening of symptoms due to some circumstantial stress.  However she reports that she does not want her medications adjusted.  No homicidal suicidal ideations.    Review of systems reveals no complaints of chest pain, shortness of breath, orthopnea or PND.  GI and  review of systems is unremarkable.  Current medications are listed in the EMR and reviewed today.      HPI  See above    Past Medical History, Past Surgical History, Family history, Social History, and Medications were all reviewed with the patient today and updated as necessary.       Current Outpatient Medications   Medication Sig Dispense Refill    escitalopram (LEXAPRO) 10 MG tablet TAKE 1 TABLET BY MOUTH DAILY 100 tablet 3    metFORMIN (GLUCOPHAGE-XR) 500 MG extended release tablet TAKE 2 TABLETS BY MOUTH DAILY (WITH DINNER). 180 tablet 3    metoprolol tartrate (LOPRESSOR) 25 MG tablet Take 1 tablet by mouth daily 90 tablet 3    simvastatin (ZOCOR) 40 MG tablet Take one tablet by mouth once daily 90 tablet 3    calcium carbonate 1500 (600 Ca) MG TABS tablet Take 1 tablet by mouth 2 times daily 180 tablet 3    ZINC PO Take by mouth       No current facility-administered medications for this visit.     Allergies   Allergen Reactions    Sitagliptin Other (See Comments)     Joint pain, abdominal pain, low back pain     Patient Active Problem List   Diagnosis    Diabetes mellitus without complication (HCC)    Chronic idiopathic constipation    Encounter for long-term (current) use of medications    Osteopenia of spine    Dysphagia    Hiatal hernia    
\"Have you been to the ER, urgent care clinic since your last visit?  Hospitalized since your last visit?\"    NO    “Have you seen or consulted any other health care providers outside our system since your last visit?”    NO    Have you had a mammogram?”   NO    Date of last Mammogram: 1/4/2023       “Have you had a diabetic eye exam?”    NO     Date of last diabetic eye exam: 11/1/2023           
Automatic Reconciliation, Ar   azithromycin (ZITHROMAX) 250 MG tablet Take 2 Tablets with food by mouth first day, then 1 tab with food by mouth daily for days 2 through 5.  Patient not taking: Reported on 2/4/2025  Demond Price MD   benzonatate (TESSALON) 200 MG capsule Take 1 capsule by mouth 3 times daily as needed for Cough  Patient not taking: Reported on 2/4/2025  Demond Price MD       [facility] (Including outside providers/suppliers regularly involved in providing care):   Patient Care Team:  Demond Price MD as PCP - General  Demond Price MD as P[facility]  Garth Vale Jr., MD as Surgeon     Recommendations for Preventive Services Due: see orders and patient instructions/AVS.  Recommended screening schedule for the next 5-10 years is provided to the patient in written form: see Patient Instructions/AVS.     Reviewed and updated this visit:  Tobacco  Allergies  Meds  Med Hx  Surg Hx  Soc Hx  Fam Hx

## 2025-02-04 NOTE — PATIENT INSTRUCTIONS
have questions, ask your doctor about seeing a registered dietitian or an exercise specialist. You might also think about joining a weight-loss support group.  If you're not ready to make changes right now, try to pick a date in the future. Then make an appointment with your doctor to talk about when and how you'll get started with a plan.  Follow-up care is a key part of your treatment and safety. Be sure to make and go to all appointments, and call your doctor if you are having problems. It's also a good idea to know your test results and keep a list of the medicines you take.  How can you care for yourself as you start a weight-loss plan?  Set realistic goals. Many people expect to lose much more weight than is likely. A weight loss of 5% to 10% of your body weight may be enough to improve your health.  Get family and friends involved to provide support. Talk to them about why you are trying to lose weight, and ask them to help. They can help by participating in exercise and having meals with you, even if they may be eating something different.  Find what works best for you. If you do not have time or do not like to cook, a program that offers meal replacement bars or shakes may be better for you. Or if you like to prepare meals, finding a plan that includes daily menus and recipes may be best.  Ask your doctor about other health professionals who can help you achieve your weight-loss goals.  A dietitian can help you make healthy changes in your diet.  An exercise specialist or  can help you develop a safe and effective exercise program.  A counselor or psychiatrist can help you cope with issues such as depression, anxiety, or family problems that can make it hard to focus on weight loss.  Consider joining a support group for people who are trying to lose weight. Your doctor can suggest groups in your area.  Where can you learn more?  Go to https://www.healthwise.net/patientEd and enter U357 to

## 2025-02-08 ENCOUNTER — HOSPITAL ENCOUNTER (OUTPATIENT)
Dept: MAMMOGRAPHY | Age: 65
Discharge: HOME | End: 2025-02-11
Attending: FAMILY MEDICINE
Payer: MEDICARE

## 2025-02-08 DIAGNOSIS — Z12.31 VISIT FOR SCREENING MAMMOGRAM: ICD-10-CM

## 2025-02-08 PROCEDURE — 77063 BREAST TOMOSYNTHESIS BI: CPT

## 2025-03-13 ENCOUNTER — TELEMEDICINE (OUTPATIENT)
Dept: FAMILY MEDICINE CLINIC | Facility: CLINIC | Age: 65
End: 2025-03-13

## 2025-03-13 DIAGNOSIS — E78.00 PURE HYPERCHOLESTEROLEMIA: ICD-10-CM

## 2025-03-13 DIAGNOSIS — I10 PRIMARY HYPERTENSION: ICD-10-CM

## 2025-03-13 DIAGNOSIS — F33.9 RECURRENT DEPRESSION: ICD-10-CM

## 2025-03-13 DIAGNOSIS — E11.65 TYPE 2 DIABETES MELLITUS WITH HYPERGLYCEMIA, WITHOUT LONG-TERM CURRENT USE OF INSULIN (HCC): Primary | ICD-10-CM

## 2025-03-13 ASSESSMENT — PATIENT HEALTH QUESTIONNAIRE - PHQ9
1. LITTLE INTEREST OR PLEASURE IN DOING THINGS: NOT AT ALL
SUM OF ALL RESPONSES TO PHQ QUESTIONS 1-9: 0
2. FEELING DOWN, DEPRESSED OR HOPELESS: NOT AT ALL
SUM OF ALL RESPONSES TO PHQ QUESTIONS 1-9: 0

## 2025-03-13 NOTE — PROGRESS NOTES
SUBJECTIVE:   Rachna Morillo is a 64 y.o. female who has a past medical history significant for hypertension, diabetes, recurrent depression and high cholesterol.  Patient presents today for virtual visit via telephone encounter.  Video [email or web address] messaging is deferred secondary to technical challenges.  Patient reports no active chest pain, shortness of breath, orthopnea or PND.  Previous visit she was complaining of some postnasal drip which is gotten better with over-the-counter Claritin.  Current medicines are listed in the EMR and reviewed today.    Patient's vital signs were not performed as encounter was performed virtually.  Location of virtual visit was patient's home.      HPI  See above    Past Medical History, Past Surgical History, Family history, Social History, and Medications were all reviewed with the patient today and updated as necessary.       Current Outpatient Medications   Medication Sig Dispense Refill    escitalopram (LEXAPRO) 10 MG tablet TAKE 1 TABLET BY MOUTH DAILY 100 tablet 3    metFORMIN (GLUCOPHAGE-XR) 500 MG extended release tablet TAKE 2 TABLETS BY MOUTH DAILY (WITH DINNER). 180 tablet 3    metoprolol tartrate (LOPRESSOR) 25 MG tablet Take 1 tablet by mouth daily 90 tablet 3    simvastatin (ZOCOR) 40 MG tablet Take one tablet by mouth once daily 90 tablet 3    calcium carbonate 1500 (600 Ca) MG TABS tablet Take 1 tablet by mouth 2 times daily 180 tablet 3    ZINC PO Take by mouth       No current facility-administered medications for this visit.     Allergies   Allergen Reactions    Sitagliptin Other (See Comments)     Joint pain, abdominal pain, low back pain     Patient Active Problem List   Diagnosis    Diabetes mellitus without complication (HCC)    Chronic idiopathic constipation    Encounter for long-term (current) use of medications    Osteopenia of spine    Dysphagia    Hiatal hernia    Primary insomnia    Cervical radiculopathy    Weight gain    Right arm weakness

## 2025-03-19 ENCOUNTER — OFFICE VISIT (OUTPATIENT)
Dept: FAMILY MEDICINE CLINIC | Facility: CLINIC | Age: 65
End: 2025-03-19
Payer: MEDICARE

## 2025-03-19 VITALS
DIASTOLIC BLOOD PRESSURE: 84 MMHG | BODY MASS INDEX: 33.6 KG/M2 | WEIGHT: 196.8 LBS | HEART RATE: 83 BPM | HEIGHT: 64 IN | OXYGEN SATURATION: 100 % | SYSTOLIC BLOOD PRESSURE: 128 MMHG

## 2025-03-19 DIAGNOSIS — I10 PRIMARY HYPERTENSION: ICD-10-CM

## 2025-03-19 DIAGNOSIS — F33.9 RECURRENT DEPRESSION: ICD-10-CM

## 2025-03-19 DIAGNOSIS — E11.65 TYPE 2 DIABETES MELLITUS WITH HYPERGLYCEMIA, WITHOUT LONG-TERM CURRENT USE OF INSULIN (HCC): ICD-10-CM

## 2025-03-19 DIAGNOSIS — E78.00 PURE HYPERCHOLESTEROLEMIA: ICD-10-CM

## 2025-03-19 DIAGNOSIS — M25.562 ACUTE PAIN OF LEFT KNEE: Primary | ICD-10-CM

## 2025-03-19 PROCEDURE — 3079F DIAST BP 80-89 MM HG: CPT | Performed by: FAMILY MEDICINE

## 2025-03-19 PROCEDURE — 3051F HG A1C>EQUAL 7.0%<8.0%: CPT | Performed by: FAMILY MEDICINE

## 2025-03-19 PROCEDURE — 99213 OFFICE O/P EST LOW 20 MIN: CPT | Performed by: FAMILY MEDICINE

## 2025-03-19 PROCEDURE — G2211 COMPLEX E/M VISIT ADD ON: HCPCS | Performed by: FAMILY MEDICINE

## 2025-03-19 PROCEDURE — 1159F MED LIST DOCD IN RCRD: CPT | Performed by: FAMILY MEDICINE

## 2025-03-19 PROCEDURE — 3074F SYST BP LT 130 MM HG: CPT | Performed by: FAMILY MEDICINE

## 2025-03-19 PROCEDURE — 1123F ACP DISCUSS/DSCN MKR DOCD: CPT | Performed by: FAMILY MEDICINE

## 2025-03-19 NOTE — PROGRESS NOTES
SUBJECTIVE:   Rachna Morillo is a 64 y.o. female who has a past medical history significant for hypertension, diabetes, recurrent depression and high cholesterol.  Patient presents today reporting that she twisted oddly several days ago and pulled her left knee awkwardly.  It has been painful and uncomfortable in the lateral aspect since.  No swelling reported.  Reports some difficulty with walking but not standing.  Has taken some Tylenol which has helped a little.    No other complaints. Review of systems reveals no complaints of chest pain, shortness of breath, orthopnea or PND.  GI and  review of systems is unremarkable.  Current medications are listed in the EMR and reviewed today.      HPI  See above    Past Medical History, Past Surgical History, Family history, Social History, and Medications were all reviewed with the patient today and updated as necessary.       Current Outpatient Medications   Medication Sig Dispense Refill    escitalopram (LEXAPRO) 10 MG tablet TAKE 1 TABLET BY MOUTH DAILY 100 tablet 3    metFORMIN (GLUCOPHAGE-XR) 500 MG extended release tablet TAKE 2 TABLETS BY MOUTH DAILY (WITH DINNER). 180 tablet 3    metoprolol tartrate (LOPRESSOR) 25 MG tablet Take 1 tablet by mouth daily 90 tablet 3    simvastatin (ZOCOR) 40 MG tablet Take one tablet by mouth once daily 90 tablet 3    calcium carbonate 1500 (600 Ca) MG TABS tablet Take 1 tablet by mouth 2 times daily 180 tablet 3    ZINC PO Take by mouth       No current facility-administered medications for this visit.     Allergies   Allergen Reactions    Sitagliptin Other (See Comments)     Joint pain, abdominal pain, low back pain     Patient Active Problem List   Diagnosis    Diabetes mellitus without complication (HCC)    Chronic idiopathic constipation    Encounter for long-term (current) use of medications    Osteopenia of spine    Dysphagia    Hiatal hernia    Primary insomnia    Cervical radiculopathy    Weight gain    Right arm

## 2025-03-19 NOTE — PROGRESS NOTES
\"Have you been to the ER, urgent care clinic since your last visit?  Hospitalized since your last visit?\"    NO    “Have you seen or consulted any other health care providers outside our system since your last visit?”    NO      “Have you had a diabetic eye exam?”    NO     Date of last diabetic eye exam: 11/1/2023

## 2025-04-03 ENCOUNTER — OFFICE VISIT (OUTPATIENT)
Dept: FAMILY MEDICINE CLINIC | Facility: CLINIC | Age: 65
End: 2025-04-03
Payer: MEDICARE

## 2025-04-03 VITALS
BODY MASS INDEX: 33.97 KG/M2 | DIASTOLIC BLOOD PRESSURE: 80 MMHG | SYSTOLIC BLOOD PRESSURE: 130 MMHG | WEIGHT: 199 LBS | HEART RATE: 72 BPM | HEIGHT: 64 IN

## 2025-04-03 DIAGNOSIS — R42 VERTIGO: Primary | ICD-10-CM

## 2025-04-03 PROCEDURE — 1159F MED LIST DOCD IN RCRD: CPT | Performed by: FAMILY MEDICINE

## 2025-04-03 PROCEDURE — 3075F SYST BP GE 130 - 139MM HG: CPT | Performed by: FAMILY MEDICINE

## 2025-04-03 PROCEDURE — 1123F ACP DISCUSS/DSCN MKR DOCD: CPT | Performed by: FAMILY MEDICINE

## 2025-04-03 PROCEDURE — 99213 OFFICE O/P EST LOW 20 MIN: CPT | Performed by: FAMILY MEDICINE

## 2025-04-03 PROCEDURE — 3079F DIAST BP 80-89 MM HG: CPT | Performed by: FAMILY MEDICINE

## 2025-04-03 RX ORDER — MECLIZINE HYDROCHLORIDE 25 MG/1
TABLET ORAL
Qty: 60 TABLET | Refills: 0 | Status: SHIPPED | OUTPATIENT
Start: 2025-04-03

## 2025-04-03 ASSESSMENT — ENCOUNTER SYMPTOMS
VOMITING: 0
SINUS PRESSURE: 0
NAUSEA: 1
SINUS PAIN: 0
SHORTNESS OF BREATH: 0

## 2025-04-03 NOTE — PROGRESS NOTES
PROGRESS NOTE    SUBJECTIVE:   Rachna Morillo is a 65 y.o. female seen for a follow up visit regarding the following chief complaint:     Chief Complaint   Patient presents with    Dizziness     Started Sunday night. Vertigo sxs.            HPI patient presents office complaining of dizziness that happened Sunday night when she got up to go to the bathroom she felt the whole world just give out from underneath her spinning around had to hold onto the walls finally made it back to bed got in bed went to sleep and woke up and it happened again.  Denies any headache nausea vomiting      Past Medical History, Past Surgical History, Family history, Social History, and Medications were all reviewed with the patient today and updated as necessary.       Current Outpatient Medications   Medication Sig Dispense Refill    meclizine (ANTIVERT) 25 MG tablet Half to 1 p.o. as needed vertigo during the day and 1 p.o. at bedtime with vertigo exercises 60 tablet 0    escitalopram (LEXAPRO) 10 MG tablet TAKE 1 TABLET BY MOUTH DAILY 100 tablet 3    metFORMIN (GLUCOPHAGE-XR) 500 MG extended release tablet TAKE 2 TABLETS BY MOUTH DAILY (WITH DINNER). 180 tablet 3    metoprolol tartrate (LOPRESSOR) 25 MG tablet Take 1 tablet by mouth daily 90 tablet 3    simvastatin (ZOCOR) 40 MG tablet Take one tablet by mouth once daily 90 tablet 3    calcium carbonate 1500 (600 Ca) MG TABS tablet Take 1 tablet by mouth 2 times daily 180 tablet 3    ZINC PO Take by mouth       No current facility-administered medications for this visit.     Allergies   Allergen Reactions    Aspirin     Hydrocodone-Acetaminophen     Sitagliptin Other (See Comments)     Joint pain, abdominal pain, low back pain     Patient Active Problem List   Diagnosis    Diabetes mellitus without complication    Chronic idiopathic constipation    Encounter for long-term (current) use of medications    Osteopenia of spine    Dysphagia    Hiatal hernia    Primary insomnia    Cervical

## 2025-05-20 ENCOUNTER — OFFICE VISIT (OUTPATIENT)
Dept: FAMILY MEDICINE CLINIC | Facility: CLINIC | Age: 65
End: 2025-05-20
Payer: MEDICARE

## 2025-05-20 VITALS
HEIGHT: 64 IN | BODY MASS INDEX: 33.97 KG/M2 | WEIGHT: 199 LBS | DIASTOLIC BLOOD PRESSURE: 78 MMHG | SYSTOLIC BLOOD PRESSURE: 122 MMHG

## 2025-05-20 DIAGNOSIS — E78.00 PURE HYPERCHOLESTEROLEMIA: ICD-10-CM

## 2025-05-20 DIAGNOSIS — E11.9 TYPE 2 DIABETES MELLITUS WITHOUT COMPLICATION, WITHOUT LONG-TERM CURRENT USE OF INSULIN (HCC): ICD-10-CM

## 2025-05-20 DIAGNOSIS — J30.9 ALLERGIC RHINITIS, UNSPECIFIED SEASONALITY, UNSPECIFIED TRIGGER: ICD-10-CM

## 2025-05-20 DIAGNOSIS — F33.9 RECURRENT DEPRESSION: ICD-10-CM

## 2025-05-20 DIAGNOSIS — H69.92 ACUTE DYSFUNCTION OF LEFT EUSTACHIAN TUBE: ICD-10-CM

## 2025-05-20 DIAGNOSIS — I10 PRIMARY HYPERTENSION: ICD-10-CM

## 2025-05-20 DIAGNOSIS — R42 DIZZY: Primary | ICD-10-CM

## 2025-05-20 PROCEDURE — 1159F MED LIST DOCD IN RCRD: CPT | Performed by: FAMILY MEDICINE

## 2025-05-20 PROCEDURE — 96372 THER/PROPH/DIAG INJ SC/IM: CPT | Performed by: FAMILY MEDICINE

## 2025-05-20 PROCEDURE — 3051F HG A1C>EQUAL 7.0%<8.0%: CPT | Performed by: FAMILY MEDICINE

## 2025-05-20 PROCEDURE — 3074F SYST BP LT 130 MM HG: CPT | Performed by: FAMILY MEDICINE

## 2025-05-20 PROCEDURE — 1123F ACP DISCUSS/DSCN MKR DOCD: CPT | Performed by: FAMILY MEDICINE

## 2025-05-20 PROCEDURE — 99214 OFFICE O/P EST MOD 30 MIN: CPT | Performed by: FAMILY MEDICINE

## 2025-05-20 PROCEDURE — 3078F DIAST BP <80 MM HG: CPT | Performed by: FAMILY MEDICINE

## 2025-05-20 RX ORDER — TRIAMCINOLONE ACETONIDE 40 MG/ML
40 INJECTION, SUSPENSION INTRA-ARTICULAR; INTRAMUSCULAR ONCE
Status: COMPLETED | OUTPATIENT
Start: 2025-05-20 | End: 2025-05-20

## 2025-05-20 RX ADMIN — TRIAMCINOLONE ACETONIDE 40 MG: 40 INJECTION, SUSPENSION INTRA-ARTICULAR; INTRAMUSCULAR at 10:07

## 2025-05-20 ASSESSMENT — PATIENT HEALTH QUESTIONNAIRE - PHQ9
1. LITTLE INTEREST OR PLEASURE IN DOING THINGS: NOT AT ALL
SUM OF ALL RESPONSES TO PHQ QUESTIONS 1-9: 0
2. FEELING DOWN, DEPRESSED OR HOPELESS: NOT AT ALL

## 2025-05-20 NOTE — PROGRESS NOTES
Have you been to the ER, urgent care clinic since your last visit?  Hospitalized since your last visit?   NO    Have you seen or consulted any other health care providers outside our system since your last visit?   NO      “Have you had a diabetic eye exam?”    NO     Date of last diabetic eye exam: 11/1/2023

## 2025-05-20 NOTE — PROGRESS NOTES
SUBJECTIVE:   Rachna Morillo is a 65 y.o. female who has a past medical history significant for hypertension, diabetes, recurrent depression, has and high cholesterol.  Patient presents today reporting a recurrence of vertigo.  She states that she has had some issues with vertigo that abruptly came on several weeks ago.  It was worse with head movement.  Patient went to see Dr. Gaxiola who gave her some Antivert and some exercises to do.  She states that her symptoms resolved but then in the last several days they have recurred.  She describes her sinuses as being full but generally on the left side.  Drainage is clear.  No fever or bodyaches.  No visual loss or disturbance.  No dysarthria or slurred speech.  No muscular weakness.    HPI  See above    Past Medical History, Past Surgical History, Family history, Social History, and Medications were all reviewed with the patient today and updated as necessary.       Current Outpatient Medications   Medication Sig Dispense Refill    meclizine (ANTIVERT) 25 MG tablet Half to 1 p.o. as needed vertigo during the day and 1 p.o. at bedtime with vertigo exercises 60 tablet 0    escitalopram (LEXAPRO) 10 MG tablet TAKE 1 TABLET BY MOUTH DAILY 100 tablet 3    metFORMIN (GLUCOPHAGE-XR) 500 MG extended release tablet TAKE 2 TABLETS BY MOUTH DAILY (WITH DINNER). 180 tablet 3    metoprolol tartrate (LOPRESSOR) 25 MG tablet Take 1 tablet by mouth daily 90 tablet 3    simvastatin (ZOCOR) 40 MG tablet Take one tablet by mouth once daily 90 tablet 3    calcium carbonate 1500 (600 Ca) MG TABS tablet Take 1 tablet by mouth 2 times daily 180 tablet 3    ZINC PO Take by mouth       No current facility-administered medications for this visit.     Allergies   Allergen Reactions    Aspirin     Hydrocodone-Acetaminophen     Sitagliptin Other (See Comments)     Joint pain, abdominal pain, low back pain     Patient Active Problem List   Diagnosis    Diabetes mellitus without complication (HCC)

## 2025-08-18 ENCOUNTER — OFFICE VISIT (OUTPATIENT)
Dept: FAMILY MEDICINE CLINIC | Facility: CLINIC | Age: 65
End: 2025-08-18
Payer: MEDICARE

## 2025-08-18 VITALS
HEART RATE: 86 BPM | SYSTOLIC BLOOD PRESSURE: 160 MMHG | OXYGEN SATURATION: 99 % | HEIGHT: 64 IN | BODY MASS INDEX: 34.69 KG/M2 | WEIGHT: 203.2 LBS | DIASTOLIC BLOOD PRESSURE: 86 MMHG

## 2025-08-18 DIAGNOSIS — R25.2 CRAMPS OF LOWER EXTREMITY: ICD-10-CM

## 2025-08-18 DIAGNOSIS — M54.2 NECK PAIN: ICD-10-CM

## 2025-08-18 DIAGNOSIS — J30.9 ALLERGIC RHINITIS, UNSPECIFIED SEASONALITY, UNSPECIFIED TRIGGER: ICD-10-CM

## 2025-08-18 DIAGNOSIS — E78.00 PURE HYPERCHOLESTEROLEMIA: ICD-10-CM

## 2025-08-18 DIAGNOSIS — M25.511 ACUTE PAIN OF RIGHT SHOULDER: ICD-10-CM

## 2025-08-18 DIAGNOSIS — R42 DIZZY: Primary | ICD-10-CM

## 2025-08-18 DIAGNOSIS — E11.65 TYPE 2 DIABETES MELLITUS WITH HYPERGLYCEMIA, WITHOUT LONG-TERM CURRENT USE OF INSULIN (HCC): ICD-10-CM

## 2025-08-18 DIAGNOSIS — F33.9 RECURRENT DEPRESSION: ICD-10-CM

## 2025-08-18 DIAGNOSIS — R53.83 FATIGUE, UNSPECIFIED TYPE: ICD-10-CM

## 2025-08-18 DIAGNOSIS — I10 PRIMARY HYPERTENSION: ICD-10-CM

## 2025-08-18 PROCEDURE — G2211 COMPLEX E/M VISIT ADD ON: HCPCS | Performed by: FAMILY MEDICINE

## 2025-08-18 PROCEDURE — 3079F DIAST BP 80-89 MM HG: CPT | Performed by: FAMILY MEDICINE

## 2025-08-18 PROCEDURE — 3077F SYST BP >= 140 MM HG: CPT | Performed by: FAMILY MEDICINE

## 2025-08-18 PROCEDURE — 99215 OFFICE O/P EST HI 40 MIN: CPT | Performed by: FAMILY MEDICINE

## 2025-08-18 PROCEDURE — 3051F HG A1C>EQUAL 7.0%<8.0%: CPT | Performed by: FAMILY MEDICINE

## 2025-08-18 PROCEDURE — 1123F ACP DISCUSS/DSCN MKR DOCD: CPT | Performed by: FAMILY MEDICINE

## 2025-08-18 PROCEDURE — 1159F MED LIST DOCD IN RCRD: CPT | Performed by: FAMILY MEDICINE

## 2025-08-18 RX ORDER — AZELASTINE 1 MG/ML
1 SPRAY, METERED NASAL 2 TIMES DAILY
Qty: 30 ML | Refills: 5 | Status: SHIPPED | OUTPATIENT
Start: 2025-08-18

## 2025-08-18 RX ORDER — MONTELUKAST SODIUM 10 MG/1
10 TABLET ORAL DAILY
Qty: 30 TABLET | Refills: 5 | Status: SHIPPED | OUTPATIENT
Start: 2025-08-18 | End: 2025-09-17

## 2025-08-18 RX ORDER — ESCITALOPRAM OXALATE 20 MG/1
20 TABLET ORAL DAILY
Qty: 90 TABLET | Refills: 3 | Status: SHIPPED | OUTPATIENT
Start: 2025-08-18

## 2025-08-18 RX ORDER — LOSARTAN POTASSIUM 25 MG/1
25 TABLET ORAL DAILY
Qty: 90 TABLET | Refills: 3 | Status: SHIPPED | OUTPATIENT
Start: 2025-08-18

## 2025-08-18 RX ORDER — BIOTIN 10 MG
1 TABLET ORAL DAILY
COMMUNITY

## 2025-08-18 RX ORDER — BACLOFEN 10 MG/1
TABLET ORAL
Qty: 14 TABLET | Refills: 0 | Status: SHIPPED | OUTPATIENT
Start: 2025-08-18

## 2025-08-18 RX ORDER — OMEGA-3S/DHA/EPA/FISH OIL/D3 360MG-1000
1 CAPSULE ORAL DAILY
COMMUNITY

## 2025-08-18 ASSESSMENT — PATIENT HEALTH QUESTIONNAIRE - PHQ9
5. POOR APPETITE OR OVEREATING: NOT AT ALL
4. FEELING TIRED OR HAVING LITTLE ENERGY: NOT AT ALL
SUM OF ALL RESPONSES TO PHQ QUESTIONS 1-9: 0
6. FEELING BAD ABOUT YOURSELF - OR THAT YOU ARE A FAILURE OR HAVE LET YOURSELF OR YOUR FAMILY DOWN: NOT AT ALL
3. TROUBLE FALLING OR STAYING ASLEEP: NOT AT ALL
9. THOUGHTS THAT YOU WOULD BE BETTER OFF DEAD, OR OF HURTING YOURSELF: NOT AT ALL
7. TROUBLE CONCENTRATING ON THINGS, SUCH AS READING THE NEWSPAPER OR WATCHING TELEVISION: NOT AT ALL
8. MOVING OR SPEAKING SO SLOWLY THAT OTHER PEOPLE COULD HAVE NOTICED. OR THE OPPOSITE, BEING SO FIGETY OR RESTLESS THAT YOU HAVE BEEN MOVING AROUND A LOT MORE THAN USUAL: NOT AT ALL
SUM OF ALL RESPONSES TO PHQ QUESTIONS 1-9: 0
2. FEELING DOWN, DEPRESSED OR HOPELESS: NOT AT ALL
SUM OF ALL RESPONSES TO PHQ QUESTIONS 1-9: 0
1. LITTLE INTEREST OR PLEASURE IN DOING THINGS: NOT AT ALL
SUM OF ALL RESPONSES TO PHQ QUESTIONS 1-9: 0
10. IF YOU CHECKED OFF ANY PROBLEMS, HOW DIFFICULT HAVE THESE PROBLEMS MADE IT FOR YOU TO DO YOUR WORK, TAKE CARE OF THINGS AT HOME, OR GET ALONG WITH OTHER PEOPLE: NOT DIFFICULT AT ALL

## 2025-08-19 ENCOUNTER — LAB (OUTPATIENT)
Dept: FAMILY MEDICINE CLINIC | Facility: CLINIC | Age: 65
End: 2025-08-19

## 2025-08-19 DIAGNOSIS — I10 PRIMARY HYPERTENSION: ICD-10-CM

## 2025-08-19 DIAGNOSIS — R53.83 FATIGUE, UNSPECIFIED TYPE: ICD-10-CM

## 2025-08-19 DIAGNOSIS — E11.65 TYPE 2 DIABETES MELLITUS WITH HYPERGLYCEMIA, WITHOUT LONG-TERM CURRENT USE OF INSULIN (HCC): ICD-10-CM

## 2025-08-19 DIAGNOSIS — E78.00 PURE HYPERCHOLESTEROLEMIA: ICD-10-CM

## 2025-08-19 LAB
ALBUMIN SERPL-MCNC: 3.2 G/DL (ref 3.2–4.6)
ALBUMIN/GLOB SERPL: 0.8 (ref 1–1.9)
ALP SERPL-CCNC: 83 U/L (ref 35–104)
ALT SERPL-CCNC: 14 U/L (ref 8–45)
ANION GAP SERPL CALC-SCNC: 10 MMOL/L (ref 7–16)
AST SERPL-CCNC: 16 U/L (ref 15–37)
BASOPHILS # BLD: 0.03 K/UL (ref 0–0.2)
BASOPHILS NFR BLD: 0.4 % (ref 0–2)
BILIRUB SERPL-MCNC: 0.3 MG/DL (ref 0–1.2)
BUN SERPL-MCNC: 8 MG/DL (ref 8–23)
CALCIUM SERPL-MCNC: 9.1 MG/DL (ref 8.8–10.2)
CHLORIDE SERPL-SCNC: 103 MMOL/L (ref 98–107)
CHOLEST SERPL-MCNC: 132 MG/DL (ref 0–200)
CO2 SERPL-SCNC: 26 MMOL/L (ref 20–29)
CREAT SERPL-MCNC: 0.58 MG/DL (ref 0.6–1.1)
DIFFERENTIAL METHOD BLD: ABNORMAL
EOSINOPHIL # BLD: 0.17 K/UL (ref 0–0.8)
EOSINOPHIL NFR BLD: 2.3 % (ref 0.5–7.8)
ERYTHROCYTE [DISTWIDTH] IN BLOOD BY AUTOMATED COUNT: 15.3 % (ref 11.9–14.6)
EST. AVERAGE GLUCOSE BLD GHB EST-MCNC: 170 MG/DL
GLOBULIN SER CALC-MCNC: 4 G/DL (ref 2.3–3.5)
GLUCOSE SERPL-MCNC: 129 MG/DL (ref 70–99)
HBA1C MFR BLD: 7.6 % (ref 0–5.6)
HCT VFR BLD AUTO: 38.4 % (ref 35.8–46.3)
HDLC SERPL-MCNC: 53 MG/DL (ref 40–60)
HDLC SERPL: 2.5 (ref 0–5)
HGB BLD-MCNC: 11.1 G/DL (ref 11.7–15.4)
IMM GRANULOCYTES # BLD AUTO: 0.03 K/UL (ref 0–0.5)
IMM GRANULOCYTES NFR BLD AUTO: 0.4 % (ref 0–5)
LDLC SERPL CALC-MCNC: 62 MG/DL (ref 0–100)
LYMPHOCYTES # BLD: 2.02 K/UL (ref 0.5–4.6)
LYMPHOCYTES NFR BLD: 27.9 % (ref 13–44)
MCH RBC QN AUTO: 24 PG (ref 26.1–32.9)
MCHC RBC AUTO-ENTMCNC: 28.9 G/DL (ref 31.4–35)
MCV RBC AUTO: 82.9 FL (ref 82–102)
MONOCYTES # BLD: 0.56 K/UL (ref 0.1–1.3)
MONOCYTES NFR BLD: 7.7 % (ref 4–12)
NEUTS SEG # BLD: 4.44 K/UL (ref 1.7–8.2)
NEUTS SEG NFR BLD: 61.3 % (ref 43–78)
NRBC # BLD: 0 K/UL (ref 0–0.2)
PLATELET # BLD AUTO: 315 K/UL (ref 150–450)
PMV BLD AUTO: 11.9 FL (ref 9.4–12.3)
POTASSIUM SERPL-SCNC: 4.3 MMOL/L (ref 3.5–5.1)
PROT SERPL-MCNC: 7.2 G/DL (ref 6.3–8.2)
RBC # BLD AUTO: 4.63 M/UL (ref 4.05–5.2)
SODIUM SERPL-SCNC: 140 MMOL/L (ref 136–145)
TRIGL SERPL-MCNC: 82 MG/DL (ref 0–150)
TSH, 3RD GENERATION: 2.08 UIU/ML (ref 0.27–4.2)
VLDLC SERPL CALC-MCNC: 16 MG/DL (ref 6–23)
WBC # BLD AUTO: 7.3 K/UL (ref 4.3–11.1)

## (undated) DEVICE — SYRINGE CATH TIP 50ML

## (undated) DEVICE — PROBE PH GAST 6.4FR 18CM PH 2 0CM 15 CHANNELS ES IMPED N

## (undated) DEVICE — CUP MED 1OZ CLR POLYPR FEED GRAD W/O LID

## (undated) DEVICE — SYR 3ML LL TIP 1/10ML GRAD --

## (undated) DEVICE — TISSUE FACE KLNX 9X8IN --

## (undated) DEVICE — BASIN EMESIS 500CC ROSE 250/CS 60/PLT: Brand: MEDEGEN MEDICAL PRODUCTS, LLC

## (undated) DEVICE — SOLUTION MNOMTR 80ML ES HI VISC FOR VISCOUS SWALLOW DONE

## (undated) DEVICE — 3M™ TRANSPORE™ WHITE SURGICAL TAPE 1534-1, 1 INCH X 10 YARD (2,5CM X 9,1M), 12 ROLLS/CARTON 10 CARTONS/CASE: Brand: 3M™ TRANSPORE™

## (undated) DEVICE — TO REMOVE ADHESIVE TAPE FROM SKIN.: Brand: PDI® ADHESIVE TAPE REMOVER PAD

## (undated) DEVICE — SWABSTICK MEDICATED ADH PREP BDINE TINC BENZ